# Patient Record
Sex: MALE | Race: BLACK OR AFRICAN AMERICAN | NOT HISPANIC OR LATINO | Employment: OTHER | URBAN - METROPOLITAN AREA
[De-identification: names, ages, dates, MRNs, and addresses within clinical notes are randomized per-mention and may not be internally consistent; named-entity substitution may affect disease eponyms.]

---

## 2023-02-15 PROBLEM — C61 PROSTATE CANCER (MULTI): Status: ACTIVE | Noted: 2023-02-15

## 2023-02-15 PROBLEM — E78.5 HYPERLIPIDEMIA: Status: ACTIVE | Noted: 2023-02-15

## 2023-02-15 PROBLEM — L60.0 ONYCHOCRYPTOSIS: Status: ACTIVE | Noted: 2023-02-15

## 2023-02-15 PROBLEM — R31.0 GROSS HEMATURIA: Status: ACTIVE | Noted: 2023-02-15

## 2023-02-15 PROBLEM — M25.569 JOINT PAIN, KNEE: Status: ACTIVE | Noted: 2023-02-15

## 2023-02-15 PROBLEM — E11.9 DIABETES MELLITUS (MULTI): Status: ACTIVE | Noted: 2023-02-15

## 2023-02-15 PROBLEM — J06.9 URI, ACUTE: Status: ACTIVE | Noted: 2023-02-15

## 2023-02-15 PROBLEM — R53.83 MALAISE AND FATIGUE: Status: ACTIVE | Noted: 2023-02-15

## 2023-02-15 PROBLEM — N52.9 MALE ERECTILE DISORDER OF ORGANIC ORIGIN: Status: ACTIVE | Noted: 2023-02-15

## 2023-02-15 PROBLEM — G47.9 SLEEPING DIFFICULTY: Status: ACTIVE | Noted: 2023-02-15

## 2023-02-15 PROBLEM — H52.4 BILATERAL PRESBYOPIA: Status: ACTIVE | Noted: 2023-02-15

## 2023-02-15 PROBLEM — R29.6 FALLS: Status: ACTIVE | Noted: 2023-02-15

## 2023-02-15 PROBLEM — B35.1 ONYCHOMYCOSIS: Status: ACTIVE | Noted: 2023-02-15

## 2023-02-15 PROBLEM — R06.83 SNORING: Status: ACTIVE | Noted: 2023-02-15
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
## 2023-02-15 PROBLEM — I10 HYPERTENSION: Status: ACTIVE | Noted: 2023-02-15

## 2023-02-15 PROBLEM — H25.13 NUCLEAR SCLEROSIS OF BOTH EYES: Status: ACTIVE | Noted: 2023-02-15

## 2023-02-15 PROBLEM — R36.1: Status: ACTIVE | Noted: 2023-02-15

## 2023-02-15 PROBLEM — R53.83 FATIGUE: Status: ACTIVE | Noted: 2023-02-15

## 2023-02-15 PROBLEM — G47.33 OSA (OBSTRUCTIVE SLEEP APNEA): Status: ACTIVE | Noted: 2023-02-15

## 2023-02-15 PROBLEM — E11.40 DIABETIC NEUROPATHY (MULTI): Status: ACTIVE | Noted: 2023-02-15

## 2023-02-15 PROBLEM — M25.519 SHOULDER PAIN: Status: ACTIVE | Noted: 2023-02-15

## 2023-02-15 PROBLEM — I48.19 ATRIAL FIBRILLATION, PERSISTENT (MULTI): Status: ACTIVE | Noted: 2023-02-15

## 2023-02-15 PROBLEM — H52.203 ASTIGMATISM, BILATERAL: Status: ACTIVE | Noted: 2023-02-15

## 2023-02-15 PROBLEM — H52.13 BILATERAL MYOPIA: Status: ACTIVE | Noted: 2023-02-15

## 2023-02-15 PROBLEM — H18.519 CORNEA GUTTATA: Status: ACTIVE | Noted: 2023-02-15

## 2023-02-15 PROBLEM — R30.9 URINARY PAIN: Status: ACTIVE | Noted: 2023-02-15

## 2023-02-15 PROBLEM — R53.81 MALAISE AND FATIGUE: Status: ACTIVE | Noted: 2023-02-15

## 2023-02-15 PROBLEM — S90.221A SUBUNGUAL HEMATOMA OF TOE, RIGHT, INITIAL ENCOUNTER: Status: ACTIVE | Noted: 2023-02-15

## 2023-02-15 PROBLEM — M79.673 PAIN OF FOOT: Status: ACTIVE | Noted: 2023-02-15

## 2023-02-15 PROBLEM — L85.3 XEROSIS CUTIS: Status: ACTIVE | Noted: 2023-02-15

## 2023-02-15 PROBLEM — E66.9 OBESITY: Status: ACTIVE | Noted: 2023-02-15

## 2023-02-15 PROBLEM — L60.1 ONYCHOLYSIS: Status: ACTIVE | Noted: 2023-02-15

## 2023-02-15 PROBLEM — M17.10 ARTHRITIS OF KNEE: Status: ACTIVE | Noted: 2023-02-15

## 2023-02-15 PROBLEM — E11.9 DIABETES MELLITUS TYPE 2 WITHOUT RETINOPATHY (MULTI): Status: ACTIVE | Noted: 2023-02-15

## 2023-02-15 PROBLEM — R29.818 SUSPECTED SLEEP APNEA: Status: ACTIVE | Noted: 2023-02-15

## 2023-02-15 PROBLEM — D12.6 TUBULAR ADENOMA OF COLON: Status: ACTIVE | Noted: 2023-02-15

## 2023-02-15 RX ORDER — BLOOD SUGAR DIAGNOSTIC
STRIP MISCELLANEOUS
COMMUNITY
Start: 2018-05-01 | End: 2023-07-24

## 2023-02-15 RX ORDER — HYDROCHLOROTHIAZIDE 25 MG/1
1 TABLET ORAL DAILY
COMMUNITY
Start: 2017-08-23 | End: 2023-04-05 | Stop reason: SDUPTHER

## 2023-02-15 RX ORDER — AMLODIPINE BESYLATE 10 MG/1
1 TABLET ORAL DAILY
COMMUNITY
Start: 2015-04-09 | End: 2023-04-05 | Stop reason: SDUPTHER

## 2023-02-15 RX ORDER — ATORVASTATIN CALCIUM 40 MG/1
1 TABLET, FILM COATED ORAL DAILY
COMMUNITY
Start: 2015-04-09 | End: 2023-04-05 | Stop reason: SDUPTHER

## 2023-02-15 RX ORDER — SILDENAFIL 50 MG/1
50 TABLET, FILM COATED ORAL AS NEEDED
COMMUNITY
Start: 2020-09-11 | End: 2023-12-04 | Stop reason: SDUPTHER

## 2023-02-15 RX ORDER — METOPROLOL TARTRATE 50 MG/1
1 TABLET ORAL EVERY 12 HOURS
COMMUNITY
Start: 2018-12-12 | End: 2023-04-05 | Stop reason: SDUPTHER

## 2023-02-15 RX ORDER — AMMONIUM LACTATE 12 G/100G
LOTION TOPICAL
COMMUNITY
Start: 2020-09-21

## 2023-02-15 RX ORDER — METFORMIN HYDROCHLORIDE 1000 MG/1
1 TABLET ORAL 2 TIMES DAILY
COMMUNITY
Start: 2018-02-02 | End: 2023-04-05 | Stop reason: SDUPTHER

## 2023-02-15 RX ORDER — POLYETHYLENE GLYCOL 3350, SODIUM SULFATE ANHYDROUS, SODIUM BICARBONATE, SODIUM CHLORIDE, POTASSIUM CHLORIDE 236; 22.74; 6.74; 5.86; 2.97 G/4L; G/4L; G/4L; G/4L; G/4L
POWDER, FOR SOLUTION ORAL
COMMUNITY
Start: 2022-11-21

## 2023-02-15 RX ORDER — MELOXICAM 15 MG/1
1 TABLET ORAL DAILY
COMMUNITY
Start: 2021-07-12 | End: 2023-04-05 | Stop reason: SDUPTHER

## 2023-02-15 RX ORDER — LISINOPRIL 40 MG/1
1 TABLET ORAL DAILY
COMMUNITY
Start: 2017-08-23 | End: 2023-04-05 | Stop reason: SDUPTHER

## 2023-04-03 RX ORDER — LISINOPRIL 40 MG/1
TABLET ORAL
Qty: 90 TABLET | Refills: 0 | OUTPATIENT
Start: 2023-04-03

## 2023-04-03 RX ORDER — HYDROCHLOROTHIAZIDE 25 MG/1
TABLET ORAL
Qty: 90 TABLET | Refills: 0 | OUTPATIENT
Start: 2023-04-03

## 2023-04-03 RX ORDER — METOPROLOL TARTRATE 50 MG/1
TABLET ORAL
Qty: 180 TABLET | Refills: 0 | OUTPATIENT
Start: 2023-04-03

## 2023-04-05 ENCOUNTER — OFFICE VISIT (OUTPATIENT)
Dept: PRIMARY CARE | Facility: CLINIC | Age: 73
End: 2023-04-05
Payer: COMMERCIAL

## 2023-04-05 VITALS
HEART RATE: 78 BPM | WEIGHT: 251 LBS | SYSTOLIC BLOOD PRESSURE: 127 MMHG | DIASTOLIC BLOOD PRESSURE: 82 MMHG | BODY MASS INDEX: 37.07 KG/M2

## 2023-04-05 DIAGNOSIS — M25.569 ARTHRALGIA OF KNEE, UNSPECIFIED LATERALITY: ICD-10-CM

## 2023-04-05 DIAGNOSIS — Z63.4 BEREAVEMENT DUE TO LIFE EVENT: ICD-10-CM

## 2023-04-05 DIAGNOSIS — G47.33 OSA (OBSTRUCTIVE SLEEP APNEA): ICD-10-CM

## 2023-04-05 DIAGNOSIS — I10 PRIMARY HYPERTENSION: ICD-10-CM

## 2023-04-05 DIAGNOSIS — I48.19 ATRIAL FIBRILLATION, PERSISTENT (MULTI): ICD-10-CM

## 2023-04-05 DIAGNOSIS — E11.9 DIABETES MELLITUS TYPE 2 WITHOUT RETINOPATHY (MULTI): Primary | ICD-10-CM

## 2023-04-05 LAB — POC HEMOGLOBIN: 7 G/DL (ref 13.5–17.5)

## 2023-04-05 PROCEDURE — 4010F ACE/ARB THERAPY RXD/TAKEN: CPT | Performed by: FAMILY MEDICINE

## 2023-04-05 PROCEDURE — 1159F MED LIST DOCD IN RCRD: CPT | Performed by: FAMILY MEDICINE

## 2023-04-05 PROCEDURE — 3079F DIAST BP 80-89 MM HG: CPT | Performed by: FAMILY MEDICINE

## 2023-04-05 PROCEDURE — 85018 HEMOGLOBIN: CPT | Performed by: FAMILY MEDICINE

## 2023-04-05 PROCEDURE — 99214 OFFICE O/P EST MOD 30 MIN: CPT | Performed by: FAMILY MEDICINE

## 2023-04-05 PROCEDURE — 3074F SYST BP LT 130 MM HG: CPT | Performed by: FAMILY MEDICINE

## 2023-04-05 PROCEDURE — 36416 COLLJ CAPILLARY BLOOD SPEC: CPT | Performed by: FAMILY MEDICINE

## 2023-04-05 RX ORDER — AMLODIPINE BESYLATE 10 MG/1
10 TABLET ORAL DAILY
Qty: 90 TABLET | Refills: 1 | Status: SHIPPED | OUTPATIENT
Start: 2023-04-05 | End: 2023-10-05 | Stop reason: SDUPTHER

## 2023-04-05 RX ORDER — LISINOPRIL 40 MG/1
40 TABLET ORAL DAILY
Qty: 90 TABLET | Refills: 1 | Status: SHIPPED | OUTPATIENT
Start: 2023-04-05 | End: 2023-10-05 | Stop reason: SDUPTHER

## 2023-04-05 RX ORDER — METOPROLOL TARTRATE 50 MG/1
50 TABLET ORAL EVERY 12 HOURS
Qty: 180 TABLET | Refills: 1 | Status: SHIPPED | OUTPATIENT
Start: 2023-04-05 | End: 2023-10-05 | Stop reason: SDUPTHER

## 2023-04-05 RX ORDER — MELOXICAM 15 MG/1
15 TABLET ORAL DAILY
Qty: 90 TABLET | Refills: 1 | Status: SHIPPED | OUTPATIENT
Start: 2023-04-05 | End: 2024-01-22

## 2023-04-05 RX ORDER — METFORMIN HYDROCHLORIDE 1000 MG/1
1000 TABLET ORAL 2 TIMES DAILY
Qty: 180 TABLET | Refills: 1 | Status: SHIPPED | OUTPATIENT
Start: 2023-04-05 | End: 2023-10-05 | Stop reason: SDUPTHER

## 2023-04-05 RX ORDER — HYDROCHLOROTHIAZIDE 25 MG/1
25 TABLET ORAL DAILY
Qty: 90 TABLET | Refills: 1 | Status: SHIPPED | OUTPATIENT
Start: 2023-04-05 | End: 2023-10-05 | Stop reason: SDUPTHER

## 2023-04-05 RX ORDER — ATORVASTATIN CALCIUM 40 MG/1
40 TABLET, FILM COATED ORAL DAILY
Qty: 90 TABLET | Refills: 1 | Status: SHIPPED | OUTPATIENT
Start: 2023-04-05 | End: 2023-10-05 | Stop reason: SDUPTHER

## 2023-04-05 SDOH — SOCIAL STABILITY - SOCIAL INSECURITY: DISSAPEARANCE AND DEATH OF FAMILY MEMBER: Z63.4

## 2023-04-05 ASSESSMENT — PATIENT HEALTH QUESTIONNAIRE - PHQ9
1. LITTLE INTEREST OR PLEASURE IN DOING THINGS: NOT AT ALL
SUM OF ALL RESPONSES TO PHQ9 QUESTIONS 1 AND 2: 0
2. FEELING DOWN, DEPRESSED OR HOPELESS: NOT AT ALL

## 2023-04-05 ASSESSMENT — ENCOUNTER SYMPTOMS
ABDOMINAL PAIN: 0
DIFFICULTY URINATING: 0
DYSPHORIC MOOD: 1
SLEEP DISTURBANCE: 0
FATIGUE: 1
SHORTNESS OF BREATH: 0
UNEXPECTED WEIGHT CHANGE: 0

## 2023-04-05 NOTE — PROGRESS NOTES
Subjective   Patient ID: Hollis Martin is a 73 y.o. male who presents for Diabetes.  Diabetes  Associated symptoms include fatigue. Pertinent negatives for diabetes include no chest pain.     Ryan presents for 3 month follow up.  He is noting some worsening depression, as he lost his brother to colon cancer 2 weeks ago.    Review of Systems   Constitutional:  Positive for fatigue. Negative for unexpected weight change.   Eyes:  Negative for visual disturbance.   Respiratory:  Negative for shortness of breath.    Cardiovascular:  Negative for chest pain.   Gastrointestinal:  Negative for abdominal pain.   Genitourinary:  Negative for difficulty urinating.   Psychiatric/Behavioral:  Positive for dysphoric mood. Negative for sleep disturbance.        Objective   Vitals:    04/05/23 1309   BP: 127/82   Pulse: 78   Weight: 114 kg (251 lb)      Physical Exam  Vitals reviewed.   Constitutional:       General: He is not in acute distress.     Appearance: Normal appearance.   HENT:      Head: Normocephalic and atraumatic.      Right Ear: External ear normal.      Left Ear: External ear normal.      Nose: Nose normal.      Mouth/Throat:      Mouth: Mucous membranes are moist.   Eyes:      Extraocular Movements: Extraocular movements intact.      Pupils: Pupils are equal, round, and reactive to light.   Cardiovascular:      Rate and Rhythm: Normal rate. Rhythm irregularly irregular.      Heart sounds: No murmur heard.     No friction rub. No gallop.   Pulmonary:      Effort: Pulmonary effort is normal.      Breath sounds: Normal breath sounds.   Abdominal:      General: There is no distension.      Palpations: Abdomen is soft.      Tenderness: There is no abdominal tenderness.   Musculoskeletal:      Cervical back: Neck supple. No tenderness.   Skin:     General: Skin is warm and dry.   Neurological:      General: No focal deficit present.      Mental Status: He is alert and oriented to person, place, and time.   Psychiatric:          Mood and Affect: Mood normal. Affect is flat.         Behavior: Behavior normal.         Assessment/Plan   There are no diagnoses linked to this encounter.    Problem List Items Addressed This Visit          Nervous    JOSHUA (obstructive sleep apnea)     Responding well to CPAP            Circulatory    Atrial fibrillation, persistent (CMS/HCC)     Rate controlled, continue Eliquis         Relevant Medications    amLODIPine (Norvasc) 10 mg tablet    apixaban (Eliquis) 5 mg tablet    metoprolol tartrate (Lopressor) 50 mg tablet    Other Relevant Orders    Follow Up In Primary Care    Hypertension     Good control, continue current medication         Relevant Medications    amLODIPine (Norvasc) 10 mg tablet    hydroCHLOROthiazide (HYDRODiuril) 25 mg tablet    lisinopril 40 mg tablet    metoprolol tartrate (Lopressor) 50 mg tablet       Endocrine/Metabolic    Diabetes mellitus type 2 without retinopathy (CMS/HCC) - Primary     A1c 7.0.  Continue current medication, follow up in 4 months         Relevant Medications    atorvastatin (Lipitor) 40 mg tablet    empagliflozin (Jardiance) 25 mg    metFORMIN (Glucophage) 1,000 mg tablet    SITagliptin phosphate (Januvia) 100 mg tablet    Other Relevant Orders    Follow Up In Primary Care       Other    Joint pain, knee    Relevant Medications    meloxicam (Mobic) 15 mg tablet     Other Visit Diagnoses       Bereavement due to life event

## 2023-07-22 DIAGNOSIS — E11.9 DIABETES MELLITUS TYPE 2 WITHOUT RETINOPATHY (MULTI): Primary | ICD-10-CM

## 2023-07-24 RX ORDER — BLOOD SUGAR DIAGNOSTIC
STRIP MISCELLANEOUS
Qty: 100 STRIP | Refills: 1 | Status: SHIPPED | OUTPATIENT
Start: 2023-07-24

## 2023-08-02 ENCOUNTER — OFFICE VISIT (OUTPATIENT)
Dept: PRIMARY CARE | Facility: CLINIC | Age: 73
End: 2023-08-02
Payer: COMMERCIAL

## 2023-08-02 VITALS
BODY MASS INDEX: 37.21 KG/M2 | DIASTOLIC BLOOD PRESSURE: 80 MMHG | HEART RATE: 72 BPM | SYSTOLIC BLOOD PRESSURE: 133 MMHG | WEIGHT: 252 LBS

## 2023-08-02 DIAGNOSIS — E11.42 DIABETIC POLYNEUROPATHY ASSOCIATED WITH TYPE 2 DIABETES MELLITUS (MULTI): ICD-10-CM

## 2023-08-02 DIAGNOSIS — I48.19 ATRIAL FIBRILLATION, PERSISTENT (MULTI): ICD-10-CM

## 2023-08-02 DIAGNOSIS — E11.9 DIABETES MELLITUS TYPE 2 WITHOUT RETINOPATHY (MULTI): Primary | ICD-10-CM

## 2023-08-02 DIAGNOSIS — C61 PROSTATE CANCER (MULTI): ICD-10-CM

## 2023-08-02 DIAGNOSIS — I10 PRIMARY HYPERTENSION: ICD-10-CM

## 2023-08-02 LAB — POC HEMOGLOBIN A1C: 7.2 % (ref 4.2–6.5)

## 2023-08-02 PROCEDURE — 1160F RVW MEDS BY RX/DR IN RCRD: CPT | Performed by: FAMILY MEDICINE

## 2023-08-02 PROCEDURE — 1126F AMNT PAIN NOTED NONE PRSNT: CPT | Performed by: FAMILY MEDICINE

## 2023-08-02 PROCEDURE — 83036 HEMOGLOBIN GLYCOSYLATED A1C: CPT | Performed by: FAMILY MEDICINE

## 2023-08-02 PROCEDURE — 3079F DIAST BP 80-89 MM HG: CPT | Performed by: FAMILY MEDICINE

## 2023-08-02 PROCEDURE — 4010F ACE/ARB THERAPY RXD/TAKEN: CPT | Performed by: FAMILY MEDICINE

## 2023-08-02 PROCEDURE — 1159F MED LIST DOCD IN RCRD: CPT | Performed by: FAMILY MEDICINE

## 2023-08-02 PROCEDURE — 1036F TOBACCO NON-USER: CPT | Performed by: FAMILY MEDICINE

## 2023-08-02 PROCEDURE — 3075F SYST BP GE 130 - 139MM HG: CPT | Performed by: FAMILY MEDICINE

## 2023-08-02 PROCEDURE — 99214 OFFICE O/P EST MOD 30 MIN: CPT | Performed by: FAMILY MEDICINE

## 2023-08-02 ASSESSMENT — ENCOUNTER SYMPTOMS
DYSPHORIC MOOD: 1
NECK PAIN: 0
ARTHRALGIAS: 0
BACK PAIN: 0
FATIGUE: 0
SHORTNESS OF BREATH: 0
ABDOMINAL PAIN: 0
DIFFICULTY URINATING: 0
UNEXPECTED WEIGHT CHANGE: 0
NUMBNESS: 1

## 2023-08-02 ASSESSMENT — PATIENT HEALTH QUESTIONNAIRE - PHQ9
2. FEELING DOWN, DEPRESSED OR HOPELESS: NOT AT ALL
SUM OF ALL RESPONSES TO PHQ9 QUESTIONS 1 AND 2: 0
1. LITTLE INTEREST OR PLEASURE IN DOING THINGS: NOT AT ALL

## 2023-08-02 NOTE — PROGRESS NOTES
Subjective   Patient ID: Hollis Martin is a 73 y.o. male who presents for Diabetes.  Diabetes  Pertinent negatives for diabetes include no chest pain and no fatigue.     Reg presents for follow up of his chronic issues.  Generally he is feeling well.  He occasionally feels down about loss of friends, but handling okay.  Review of Systems   Constitutional:  Negative for fatigue and unexpected weight change.   Eyes:  Negative for visual disturbance.        Has some mild issues with cataracts   Respiratory:  Negative for shortness of breath.    Cardiovascular:  Negative for chest pain.   Gastrointestinal:  Negative for abdominal pain.   Genitourinary:  Negative for difficulty urinating.   Musculoskeletal:  Negative for arthralgias, back pain and neck pain.   Neurological:  Positive for numbness (Slight worsening in feet).   Psychiatric/Behavioral:  Positive for dysphoric mood.        Objective   Vitals:    08/02/23 1550   BP: 133/80   Pulse: 72   Weight: 114 kg (252 lb)      Physical Exam  Vitals reviewed.   Constitutional:       General: He is not in acute distress.     Appearance: He is obese.   HENT:      Head: Normocephalic and atraumatic.      Right Ear: External ear normal.      Left Ear: External ear normal.      Nose: Nose normal.      Mouth/Throat:      Mouth: Mucous membranes are moist.   Eyes:      Extraocular Movements: Extraocular movements intact.      Pupils: Pupils are equal, round, and reactive to light.   Cardiovascular:      Rate and Rhythm: Normal rate. Rhythm irregularly irregular.      Heart sounds: No murmur heard.     No friction rub. No gallop.   Pulmonary:      Effort: Pulmonary effort is normal.      Breath sounds: Normal breath sounds.   Abdominal:      General: There is no distension.      Palpations: Abdomen is soft.      Tenderness: There is no abdominal tenderness.   Musculoskeletal:      Cervical back: Neck supple. No tenderness.   Skin:     General: Skin is warm and dry.    Neurological:      General: No focal deficit present.      Mental Status: He is alert and oriented to person, place, and time.   Psychiatric:         Mood and Affect: Mood normal.         Behavior: Behavior normal.         Assessment/Plan   Diagnoses and all orders for this visit:  Diabetes mellitus type 2 without retinopathy (CMS/HCC)  -     Follow Up In Primary Care  Atrial fibrillation, persistent (CMS/HCC)  -     Follow Up In Primary Care      Problem List Items Addressed This Visit       Atrial fibrillation, persistent (CMS/HCC)     Rate controlled, continue Eliquis         Diabetes mellitus type 2 without retinopathy (CMS/HCC) - Primary     A1c 7.2, still good control.  Continue same meds, follow up in 4 months         Relevant Orders    POCT glycosylated hemoglobin (Hb A1C) manually resulted (Completed)    Diabetic neuropathy (CMS/HCC)     Mild worsening but not bothersome         Hypertension     Well controlled         Prostate cancer (CMS/HCC)     Asymptomatic, has long term follow up with urology

## 2023-10-04 DIAGNOSIS — I48.19 ATRIAL FIBRILLATION, PERSISTENT (MULTI): ICD-10-CM

## 2023-10-04 RX ORDER — APIXABAN 5 MG/1
5 TABLET, FILM COATED ORAL 2 TIMES DAILY
Qty: 180 TABLET | Refills: 0 | Status: SHIPPED | OUTPATIENT
Start: 2023-10-04 | End: 2024-01-02

## 2023-10-05 ENCOUNTER — TELEPHONE (OUTPATIENT)
Dept: PRIMARY CARE | Facility: CLINIC | Age: 73
End: 2023-10-05
Payer: COMMERCIAL

## 2023-10-05 DIAGNOSIS — I48.19 ATRIAL FIBRILLATION, PERSISTENT (MULTI): ICD-10-CM

## 2023-10-05 DIAGNOSIS — I10 PRIMARY HYPERTENSION: ICD-10-CM

## 2023-10-05 DIAGNOSIS — E11.9 DIABETES MELLITUS TYPE 2 WITHOUT RETINOPATHY (MULTI): ICD-10-CM

## 2023-10-05 RX ORDER — HYDROCHLOROTHIAZIDE 25 MG/1
25 TABLET ORAL DAILY
Qty: 90 TABLET | Refills: 1 | Status: SHIPPED | OUTPATIENT
Start: 2023-10-05 | End: 2024-04-03 | Stop reason: SDUPTHER

## 2023-10-05 RX ORDER — LISINOPRIL 40 MG/1
40 TABLET ORAL DAILY
Qty: 90 TABLET | Refills: 1 | Status: SHIPPED | OUTPATIENT
Start: 2023-10-05 | End: 2024-04-03 | Stop reason: SDUPTHER

## 2023-10-05 RX ORDER — AMLODIPINE BESYLATE 10 MG/1
10 TABLET ORAL DAILY
Qty: 90 TABLET | Refills: 1 | Status: SHIPPED | OUTPATIENT
Start: 2023-10-05 | End: 2024-04-03 | Stop reason: SDUPTHER

## 2023-10-05 RX ORDER — METOPROLOL TARTRATE 50 MG/1
50 TABLET ORAL EVERY 12 HOURS
Qty: 180 TABLET | Refills: 1 | Status: SHIPPED | OUTPATIENT
Start: 2023-10-05 | End: 2024-04-03 | Stop reason: SDUPTHER

## 2023-10-05 RX ORDER — ATORVASTATIN CALCIUM 40 MG/1
40 TABLET, FILM COATED ORAL DAILY
Qty: 90 TABLET | Refills: 1 | Status: SHIPPED | OUTPATIENT
Start: 2023-10-05 | End: 2024-04-03 | Stop reason: SDUPTHER

## 2023-10-05 RX ORDER — METFORMIN HYDROCHLORIDE 1000 MG/1
1000 TABLET ORAL 2 TIMES DAILY
Qty: 180 TABLET | Refills: 1 | Status: SHIPPED | OUTPATIENT
Start: 2023-10-05 | End: 2024-04-03 | Stop reason: SDUPTHER

## 2023-10-05 NOTE — TELEPHONE ENCOUNTER
Patient left  requesting refills on Amlodipine, Lisinorpil, and hydrochlorothiazide to be sent to his local pharmacy

## 2023-11-16 ENCOUNTER — OFFICE VISIT (OUTPATIENT)
Dept: PODIATRY | Facility: CLINIC | Age: 73
End: 2023-11-16
Payer: COMMERCIAL

## 2023-11-16 DIAGNOSIS — B35.1 ONYCHOMYCOSIS: ICD-10-CM

## 2023-11-16 DIAGNOSIS — M79.672 PAIN IN BOTH FEET: Primary | ICD-10-CM

## 2023-11-16 DIAGNOSIS — L60.1 ONYCHOLYSIS: ICD-10-CM

## 2023-11-16 DIAGNOSIS — M79.671 PAIN IN BOTH FEET: Primary | ICD-10-CM

## 2023-11-16 DIAGNOSIS — E11.42 DIABETIC POLYNEUROPATHY ASSOCIATED WITH TYPE 2 DIABETES MELLITUS (MULTI): ICD-10-CM

## 2023-11-16 PROCEDURE — 4010F ACE/ARB THERAPY RXD/TAKEN: CPT | Performed by: PODIATRIST

## 2023-11-16 PROCEDURE — 1159F MED LIST DOCD IN RCRD: CPT | Performed by: PODIATRIST

## 2023-11-16 PROCEDURE — 99213 OFFICE O/P EST LOW 20 MIN: CPT | Performed by: PODIATRIST

## 2023-11-16 PROCEDURE — 1036F TOBACCO NON-USER: CPT | Performed by: PODIATRIST

## 2023-11-16 PROCEDURE — 1160F RVW MEDS BY RX/DR IN RCRD: CPT | Performed by: PODIATRIST

## 2023-11-16 PROCEDURE — 1126F AMNT PAIN NOTED NONE PRSNT: CPT | Performed by: PODIATRIST

## 2023-11-16 NOTE — PROGRESS NOTES
Chief Complaint   Patient presents with    nail care     CHANDA         Patient has the chief complaint of painful nails on both feet. There is a history of either ingrown nails or other problems with the nails. This includes difficulty with ambulation and wearing shoes.  Glucose stable.  No A1c's lately.  He does use moisturizer often.  There are no other complaints at this time. No changes in past medical history. Past medical history no changes review of systems per baseline.     Constitutional: Patient alert. No acute distress.  Psychiatric: Normal mood and affect.  HEENT: Sclera clear. Wearing glasses.  Respiratory: Breathing unlabored.  Dermatologic: Clinically nails are hypertrophic crumbly and yellow. Painful to palpation without evidence of any acute inflammatory infectious process. Baseline mild distal tuft the dermal tissue protruding beneath the nail plates first and fourth digits. No signs of bacterial infection. No acute inflammation. Not infected or inflamed. This is baseline. Web spaces are dry. No ulcers no pre-ulcerative areas.  Minimal xerosis noted.   Neurological: Mild sensory impairment.   Musculoskeletal: Motor function is intact for patient's age.  Vascular: Pedal pulses are intact and symmetric. No appreciable swelling.     Impression: Onychomycosis bilateral foot pain, xerosis, and diabetes and neuropathy.     -Today's treatment and course of therapy was discussed with the patient in detail. Patient's questions were answered. Proper foot care was discussed. This dictation was done using Dragon computer software and as such may contain grammatical errors.      -Diabetic foot care discussed.  Check feet daily.     -Review seasonal foot care.  Moisturizer use encouraged.

## 2023-12-04 ENCOUNTER — OFFICE VISIT (OUTPATIENT)
Dept: PRIMARY CARE | Facility: CLINIC | Age: 73
End: 2023-12-04
Payer: COMMERCIAL

## 2023-12-04 VITALS
HEIGHT: 68 IN | WEIGHT: 255 LBS | SYSTOLIC BLOOD PRESSURE: 149 MMHG | DIASTOLIC BLOOD PRESSURE: 90 MMHG | BODY MASS INDEX: 38.65 KG/M2 | OXYGEN SATURATION: 92 % | HEART RATE: 81 BPM

## 2023-12-04 DIAGNOSIS — I48.19 ATRIAL FIBRILLATION, PERSISTENT (MULTI): ICD-10-CM

## 2023-12-04 DIAGNOSIS — Z23 NEED FOR IMMUNIZATION AGAINST INFLUENZA: ICD-10-CM

## 2023-12-04 DIAGNOSIS — Z00.00 MEDICARE ANNUAL WELLNESS VISIT, SUBSEQUENT: ICD-10-CM

## 2023-12-04 DIAGNOSIS — N52.9 MALE ERECTILE DISORDER OF ORGANIC ORIGIN: ICD-10-CM

## 2023-12-04 DIAGNOSIS — E11.9 DIABETES MELLITUS TYPE 2 WITHOUT RETINOPATHY (MULTI): Primary | ICD-10-CM

## 2023-12-04 LAB — POC HEMOGLOBIN A1C: 6.9 % (ref 4.2–6.5)

## 2023-12-04 PROCEDURE — 3080F DIAST BP >= 90 MM HG: CPT | Performed by: FAMILY MEDICINE

## 2023-12-04 PROCEDURE — 3077F SYST BP >= 140 MM HG: CPT | Performed by: FAMILY MEDICINE

## 2023-12-04 PROCEDURE — 83036 HEMOGLOBIN GLYCOSYLATED A1C: CPT | Performed by: FAMILY MEDICINE

## 2023-12-04 PROCEDURE — 1126F AMNT PAIN NOTED NONE PRSNT: CPT | Performed by: FAMILY MEDICINE

## 2023-12-04 PROCEDURE — 90471 IMMUNIZATION ADMIN: CPT | Performed by: FAMILY MEDICINE

## 2023-12-04 PROCEDURE — 99213 OFFICE O/P EST LOW 20 MIN: CPT | Performed by: FAMILY MEDICINE

## 2023-12-04 PROCEDURE — 1170F FXNL STATUS ASSESSED: CPT | Performed by: FAMILY MEDICINE

## 2023-12-04 PROCEDURE — 90662 IIV NO PRSV INCREASED AG IM: CPT | Performed by: FAMILY MEDICINE

## 2023-12-04 PROCEDURE — 1036F TOBACCO NON-USER: CPT | Performed by: FAMILY MEDICINE

## 2023-12-04 PROCEDURE — 1160F RVW MEDS BY RX/DR IN RCRD: CPT | Performed by: FAMILY MEDICINE

## 2023-12-04 PROCEDURE — 1159F MED LIST DOCD IN RCRD: CPT | Performed by: FAMILY MEDICINE

## 2023-12-04 PROCEDURE — G0439 PPPS, SUBSEQ VISIT: HCPCS | Performed by: FAMILY MEDICINE

## 2023-12-04 PROCEDURE — 4010F ACE/ARB THERAPY RXD/TAKEN: CPT | Performed by: FAMILY MEDICINE

## 2023-12-04 RX ORDER — SILDENAFIL 100 MG/1
100 TABLET, FILM COATED ORAL AS NEEDED
Qty: 10 TABLET | Refills: 2 | Status: SHIPPED | OUTPATIENT
Start: 2023-12-04

## 2023-12-04 ASSESSMENT — PATIENT HEALTH QUESTIONNAIRE - PHQ9
SUM OF ALL RESPONSES TO PHQ9 QUESTIONS 1 AND 2: 0
2. FEELING DOWN, DEPRESSED OR HOPELESS: SEVERAL DAYS
SUM OF ALL RESPONSES TO PHQ9 QUESTIONS 1 AND 2: 2
1. LITTLE INTEREST OR PLEASURE IN DOING THINGS: NOT AT ALL
2. FEELING DOWN, DEPRESSED OR HOPELESS: NOT AT ALL
1. LITTLE INTEREST OR PLEASURE IN DOING THINGS: SEVERAL DAYS
10. IF YOU CHECKED OFF ANY PROBLEMS, HOW DIFFICULT HAVE THESE PROBLEMS MADE IT FOR YOU TO DO YOUR WORK, TAKE CARE OF THINGS AT HOME, OR GET ALONG WITH OTHER PEOPLE: SOMEWHAT DIFFICULT

## 2023-12-04 ASSESSMENT — ACTIVITIES OF DAILY LIVING (ADL)
TAKING_MEDICATION: INDEPENDENT
BATHING: INDEPENDENT
DRESSING: INDEPENDENT
DOING_HOUSEWORK: INDEPENDENT
MANAGING_FINANCES: INDEPENDENT
GROCERY_SHOPPING: INDEPENDENT

## 2023-12-04 ASSESSMENT — ENCOUNTER SYMPTOMS
DEPRESSION: 0
SLEEP DISTURBANCE: 1
LOSS OF SENSATION IN FEET: 0
NECK PAIN: 0
BACK PAIN: 0
ABDOMINAL PAIN: 0
ARTHRALGIAS: 0
OCCASIONAL FEELINGS OF UNSTEADINESS: 0
SHORTNESS OF BREATH: 0
DIFFICULTY URINATING: 0
DYSPHORIC MOOD: 1
FATIGUE: 1

## 2023-12-04 NOTE — PROGRESS NOTES
"Subjective   Reason for Visit: Hollis Martin is an 73 y.o. male here for a Medicare Wellness visit.          Reviewed all medications by prescribing practitioner or clinical pharmacist (such as prescriptions, OTCs, herbal therapies and supplements) and documented in the medical record.    HPI  Ryan presents for MWV, also diabetes follow up.  He feels well, still notes some mild fatigue and depressed mood attributed to losing close friends.  Patient Care Team:  Zeke Alford MD as PCP - General     Review of Systems   Constitutional:  Positive for fatigue.   Eyes:  Negative for visual disturbance.   Respiratory:  Negative for shortness of breath.    Cardiovascular:  Negative for chest pain.   Gastrointestinal:  Negative for abdominal pain.   Genitourinary:  Negative for difficulty urinating.   Musculoskeletal:  Negative for arthralgias, back pain and neck pain.   Psychiatric/Behavioral:  Positive for dysphoric mood (mild) and sleep disturbance.        Objective   Vitals:  /90   Pulse 81   Ht 1.715 m (5' 7.5\")   Wt 116 kg (255 lb)   SpO2 92%   BMI 39.35 kg/m²       Physical Exam  Vitals reviewed.   Constitutional:       General: He is not in acute distress.     Appearance: Normal appearance.   HENT:      Head: Normocephalic and atraumatic.      Right Ear: External ear normal.      Left Ear: External ear normal.      Nose: Nose normal.      Mouth/Throat:      Mouth: Mucous membranes are moist.   Eyes:      Extraocular Movements: Extraocular movements intact.      Pupils: Pupils are equal, round, and reactive to light.   Cardiovascular:      Rate and Rhythm: Normal rate. Rhythm irregularly irregular.      Heart sounds: No murmur heard.     No friction rub. No gallop.   Pulmonary:      Effort: Pulmonary effort is normal.      Breath sounds: Normal breath sounds.   Abdominal:      General: There is no distension.      Palpations: Abdomen is soft.      Tenderness: There is no abdominal tenderness. "   Musculoskeletal:      Cervical back: Neck supple. No tenderness.      Right lower leg: No edema.      Left lower leg: No edema.   Skin:     General: Skin is warm and dry.   Neurological:      General: No focal deficit present.      Mental Status: He is alert and oriented to person, place, and time.   Psychiatric:         Mood and Affect: Mood normal.         Behavior: Behavior normal.         Assessment/Plan   Problem List Items Addressed This Visit    None    Hollis was seen today for annual exam.  Diagnoses and all orders for this visit:  Medicare annual wellness visit, subsequent (Primary)  Comments:  Overall doing well.  Lives independently  Orders:  -     Follow Up In Primary Care - Medicare Annual; Future  Diabetes mellitus type 2 without retinopathy (CMS/HCC)  -     Follow Up In Primary Care - Established; Future  -     POCT glycosylated hemoglobin (Hb A1C) manually resulted  Atrial fibrillation, persistent (CMS/HCC)  -     Follow Up In Primary Care - Established; Future  Need for immunization against influenza  -     Flu vaccine, quadrivalent, high-dose, preservative free, age 65y+ (FLUZONE)  Male erectile disorder of organic origin  -     sildenafil (Viagra) 100 mg tablet; Take 1 tablet (100 mg) by mouth if needed for erectile dysfunction (take 1 tablet 30-60 minutes prior to need).  Other orders  -     Follow Up In Primary Care - Medicare Annual

## 2024-01-02 DIAGNOSIS — I48.19 ATRIAL FIBRILLATION, PERSISTENT (MULTI): ICD-10-CM

## 2024-01-02 RX ORDER — APIXABAN 5 MG/1
5 TABLET, FILM COATED ORAL 2 TIMES DAILY
Qty: 180 TABLET | Refills: 0 | Status: SHIPPED | OUTPATIENT
Start: 2024-01-02 | End: 2024-04-23

## 2024-01-19 DIAGNOSIS — M25.569 ARTHRALGIA OF KNEE, UNSPECIFIED LATERALITY: ICD-10-CM

## 2024-01-22 RX ORDER — MELOXICAM 15 MG/1
15 TABLET ORAL DAILY
Qty: 90 TABLET | Refills: 0 | Status: SHIPPED | OUTPATIENT
Start: 2024-01-22 | End: 2024-04-23

## 2024-01-26 ENCOUNTER — TELEPHONE (OUTPATIENT)
Dept: PRIMARY CARE | Facility: CLINIC | Age: 74
End: 2024-01-26
Payer: COMMERCIAL

## 2024-01-26 DIAGNOSIS — E11.9 DIABETES MELLITUS TYPE 2 WITHOUT RETINOPATHY (MULTI): Primary | ICD-10-CM

## 2024-01-26 NOTE — TELEPHONE ENCOUNTER
Patient left  on RX line asking for alternative med to Jardiance. He says the price went up and he can no longer afford it monthly.

## 2024-01-30 RX ORDER — DAPAGLIFLOZIN 10 MG/1
10 TABLET, FILM COATED ORAL DAILY
Qty: 90 TABLET | Refills: 1 | Status: SHIPPED | OUTPATIENT
Start: 2024-01-30 | End: 2024-04-03 | Stop reason: SDUPTHER

## 2024-01-30 NOTE — TELEPHONE ENCOUNTER
I will try to send Farxiga to pharmacy.  It is structurally similar to Jardiance.  If it is same cost patient doesn't have to , we could try something else

## 2024-02-01 NOTE — TELEPHONE ENCOUNTER
Patient called back to say Farxiga is too expensive as well. He says he is going to continue on the Jadiance until his next appt and will discuss alternative options at his appt. He will not be starting the Farxiga

## 2024-02-02 ENCOUNTER — TELEPHONE (OUTPATIENT)
Dept: PRIMARY CARE | Facility: CLINIC | Age: 74
End: 2024-02-02
Payer: COMMERCIAL

## 2024-02-02 NOTE — TELEPHONE ENCOUNTER
Teresa the Pharmacist from [facility] called to confirm the dosage and instructions for SITagliptin phosphate (Januvia) 100 mg tablet Take 1 tablet (100 mg) by mouth once daily.

## 2024-02-14 NOTE — TELEPHONE ENCOUNTER
Can let Reg know that I will be getting Farxiga tablet samples soon, and that it will also soon be generic so may be much easier for him to afford.

## 2024-02-15 ENCOUNTER — PROCEDURE VISIT (OUTPATIENT)
Dept: PODIATRY | Facility: CLINIC | Age: 74
End: 2024-02-15
Payer: COMMERCIAL

## 2024-02-15 DIAGNOSIS — M79.672 PAIN IN BOTH FEET: Primary | ICD-10-CM

## 2024-02-15 DIAGNOSIS — M79.671 PAIN IN BOTH FEET: Primary | ICD-10-CM

## 2024-02-15 DIAGNOSIS — L60.1 ONYCHOLYSIS: ICD-10-CM

## 2024-02-15 DIAGNOSIS — B35.1 ONYCHOMYCOSIS: ICD-10-CM

## 2024-02-15 DIAGNOSIS — L85.3 XEROSIS CUTIS: ICD-10-CM

## 2024-02-15 DIAGNOSIS — E11.42 DIABETIC POLYNEUROPATHY ASSOCIATED WITH TYPE 2 DIABETES MELLITUS (MULTI): ICD-10-CM

## 2024-02-15 PROCEDURE — 99213 OFFICE O/P EST LOW 20 MIN: CPT | Performed by: PODIATRIST

## 2024-02-15 NOTE — PROGRESS NOTES
Chief Complaint   Patient presents with    DM Foot Care     Patient presents today for diabetic foot care         Patient has the chief complaint of painful nails on both feet. There is a history of either ingrown nails or other problems with the nails. This includes difficulty with ambulation and wearing shoes.  Currently taking Jardiance for diabetes control.  He does use moisturizer often.  There are no other complaints at this time. No changes in past medical history. Past medical history no changes review of systems per baseline.     Constitutional: Patient alert. No acute distress.  Psychiatric: Normal mood and affect.  HEENT: Sclera clear. Wearing glasses.  Respiratory: Breathing unlabored.  Dermatologic: Clinically nails are hypertrophic crumbly and yellow. Painful to palpation without evidence of any acute inflammatory infectious process. Baseline mild distal tuft the dermal tissue protruding beneath the nail plates first and fourth digits. No signs of bacterial infection. No acute inflammation. Not infected or inflamed. This is baseline. Web spaces are dry. No ulcers no pre-ulcerative areas.  Minimal xerosis noted.   Neurological: Mild sensory impairment.   Musculoskeletal: Motor function is intact for patient's age.  Vascular: Pedal pulses are intact and symmetric. No appreciable swelling.     Impression: Onychomycosis bilateral foot pain, xerosis, and diabetes and neuropathy.     -Today's treatment and course of therapy was discussed with the patient in detail. Patient's questions were answered. Proper foot care was discussed. This dictation was done using Dragon computer software and as such may contain grammatical errors.      -Diabetic foot care discussed.  Check feet daily.     -Review seasonal foot care.  Continue with moisturizer use encouraged.

## 2024-03-07 DIAGNOSIS — E11.9 DIABETES MELLITUS TYPE 2 WITHOUT RETINOPATHY (MULTI): Primary | ICD-10-CM

## 2024-03-07 RX ORDER — EMPAGLIFLOZIN 25 MG/1
25 TABLET, FILM COATED ORAL DAILY
Qty: 90 TABLET | Refills: 0 | OUTPATIENT
Start: 2024-03-07

## 2024-03-07 NOTE — TELEPHONE ENCOUNTER
PT called rx line requesting a refill on his jardiance 25 mg, PT has also asked if there was any discount cards to help with the cost of the rx.

## 2024-04-03 ENCOUNTER — OFFICE VISIT (OUTPATIENT)
Dept: PRIMARY CARE | Facility: CLINIC | Age: 74
End: 2024-04-03
Payer: COMMERCIAL

## 2024-04-03 VITALS
OXYGEN SATURATION: 93 % | WEIGHT: 254 LBS | SYSTOLIC BLOOD PRESSURE: 126 MMHG | BODY MASS INDEX: 38.49 KG/M2 | HEIGHT: 68 IN | DIASTOLIC BLOOD PRESSURE: 68 MMHG

## 2024-04-03 DIAGNOSIS — I48.19 ATRIAL FIBRILLATION, PERSISTENT (MULTI): ICD-10-CM

## 2024-04-03 DIAGNOSIS — I10 PRIMARY HYPERTENSION: ICD-10-CM

## 2024-04-03 DIAGNOSIS — E11.9 DIABETES MELLITUS TYPE 2 WITHOUT RETINOPATHY (MULTI): Primary | ICD-10-CM

## 2024-04-03 LAB — POC HEMOGLOBIN A1C: 7.4 % (ref 4.2–6.5)

## 2024-04-03 PROCEDURE — 99214 OFFICE O/P EST MOD 30 MIN: CPT | Performed by: FAMILY MEDICINE

## 2024-04-03 PROCEDURE — 3078F DIAST BP <80 MM HG: CPT | Performed by: FAMILY MEDICINE

## 2024-04-03 PROCEDURE — 83036 HEMOGLOBIN GLYCOSYLATED A1C: CPT | Performed by: FAMILY MEDICINE

## 2024-04-03 PROCEDURE — 3074F SYST BP LT 130 MM HG: CPT | Performed by: FAMILY MEDICINE

## 2024-04-03 PROCEDURE — 1123F ACP DISCUSS/DSCN MKR DOCD: CPT | Performed by: FAMILY MEDICINE

## 2024-04-03 PROCEDURE — 4010F ACE/ARB THERAPY RXD/TAKEN: CPT | Performed by: FAMILY MEDICINE

## 2024-04-03 PROCEDURE — 1160F RVW MEDS BY RX/DR IN RCRD: CPT | Performed by: FAMILY MEDICINE

## 2024-04-03 PROCEDURE — 1159F MED LIST DOCD IN RCRD: CPT | Performed by: FAMILY MEDICINE

## 2024-04-03 PROCEDURE — 1036F TOBACCO NON-USER: CPT | Performed by: FAMILY MEDICINE

## 2024-04-03 RX ORDER — LANCETS
EACH MISCELLANEOUS
Qty: 100 EACH | Refills: 3 | Status: SHIPPED | OUTPATIENT
Start: 2024-04-03

## 2024-04-03 RX ORDER — METFORMIN HYDROCHLORIDE 1000 MG/1
1000 TABLET ORAL 2 TIMES DAILY
Qty: 180 TABLET | Refills: 1 | Status: SHIPPED | OUTPATIENT
Start: 2024-04-03 | End: 2024-09-30

## 2024-04-03 RX ORDER — AMLODIPINE BESYLATE 10 MG/1
10 TABLET ORAL DAILY
Qty: 90 TABLET | Refills: 1 | Status: SHIPPED | OUTPATIENT
Start: 2024-04-03 | End: 2024-09-30

## 2024-04-03 RX ORDER — ATORVASTATIN CALCIUM 40 MG/1
40 TABLET, FILM COATED ORAL DAILY
Qty: 90 TABLET | Refills: 1 | Status: SHIPPED | OUTPATIENT
Start: 2024-04-03 | End: 2024-09-30

## 2024-04-03 RX ORDER — METOPROLOL TARTRATE 50 MG/1
50 TABLET ORAL EVERY 12 HOURS
Qty: 180 TABLET | Refills: 1 | Status: SHIPPED | OUTPATIENT
Start: 2024-04-03 | End: 2024-09-30

## 2024-04-03 RX ORDER — LISINOPRIL 40 MG/1
40 TABLET ORAL DAILY
Qty: 90 TABLET | Refills: 1 | Status: SHIPPED | OUTPATIENT
Start: 2024-04-03 | End: 2024-09-30

## 2024-04-03 RX ORDER — HYDROCHLOROTHIAZIDE 25 MG/1
25 TABLET ORAL DAILY
Qty: 90 TABLET | Refills: 1 | Status: SHIPPED | OUTPATIENT
Start: 2024-04-03 | End: 2024-09-30

## 2024-04-03 RX ORDER — DAPAGLIFLOZIN 10 MG/1
10 TABLET, FILM COATED ORAL DAILY
Qty: 56 TABLET | Refills: 0 | Status: SHIPPED | COMMUNITY
Start: 2024-04-03 | End: 2024-09-30

## 2024-04-03 ASSESSMENT — ENCOUNTER SYMPTOMS
SLEEP DISTURBANCE: 0
ARTHRALGIAS: 1
NUMBNESS: 1
CONSTIPATION: 0
DIFFICULTY URINATING: 0
ABDOMINAL PAIN: 0
FATIGUE: 1
SHORTNESS OF BREATH: 0
DIARRHEA: 0

## 2024-04-03 ASSESSMENT — PATIENT HEALTH QUESTIONNAIRE - PHQ9
1. LITTLE INTEREST OR PLEASURE IN DOING THINGS: NOT AT ALL
2. FEELING DOWN, DEPRESSED OR HOPELESS: NOT AT ALL
SUM OF ALL RESPONSES TO PHQ9 QUESTIONS 1 AND 2: 0

## 2024-04-03 NOTE — PROGRESS NOTES
"Subjective   Patient ID: Hollis Martin is a 74 y.o. male who presents for Diabetes (PT is here for 3 month DM follow up and medication review).  Diabetes  Associated symptoms include fatigue. Pertinent negatives for diabetes include no chest pain.     Reg presents for 3 month follow up.  Generally he feels well, overall achy and less mobile.  He cannot afford copay for SGLT2 meds.  Review of Systems   Constitutional:  Positive for fatigue.   Eyes:  Negative for visual disturbance.   Respiratory:  Negative for shortness of breath.    Cardiovascular:  Negative for chest pain.   Gastrointestinal:  Negative for abdominal pain, constipation and diarrhea.   Genitourinary:  Negative for difficulty urinating.   Musculoskeletal:  Positive for arthralgias.   Neurological:  Positive for numbness (mild to fingers).   Psychiatric/Behavioral:  Negative for sleep disturbance.        Objective   Vitals:    04/03/24 1403   BP: 126/68   SpO2: 93%   Weight: 115 kg (254 lb)   Height: 1.715 m (5' 7.5\")      Physical Exam  Vitals reviewed.   Constitutional:       General: He is not in acute distress.     Appearance: Normal appearance.   HENT:      Head: Normocephalic and atraumatic.      Right Ear: External ear normal.      Left Ear: External ear normal.      Nose: Nose normal.      Mouth/Throat:      Mouth: Mucous membranes are moist.   Eyes:      Extraocular Movements: Extraocular movements intact.      Pupils: Pupils are equal, round, and reactive to light.   Cardiovascular:      Rate and Rhythm: Normal rate. Rhythm regularly irregular.      Heart sounds: No murmur heard.     No friction rub. No gallop.   Pulmonary:      Effort: Pulmonary effort is normal.      Breath sounds: Normal breath sounds.   Abdominal:      General: There is no distension.      Palpations: Abdomen is soft.      Tenderness: There is no abdominal tenderness.   Musculoskeletal:      Cervical back: Neck supple. No tenderness.   Skin:     General: Skin is warm and " dry.   Neurological:      General: No focal deficit present.      Mental Status: He is alert and oriented to person, place, and time.   Psychiatric:         Mood and Affect: Mood normal.         Behavior: Behavior normal.         Assessment/Plan   Diagnoses and all orders for this visit:  Atrial fibrillation, persistent (CMS/HCC)  -     Follow Up In Primary Care - Established  Diabetes mellitus type 2 without retinopathy (CMS/HCC)  -     Follow Up In Primary Care - Established      Problem List Items Addressed This Visit             ICD-10-CM    Atrial fibrillation, persistent (CMS/HCC) I48.19     Rate controlled.         Relevant Medications    amLODIPine (Norvasc) 10 mg tablet    metoprolol tartrate (Lopressor) 50 mg tablet    Diabetes mellitus type 2 without retinopathy (CMS/HCC) - Primary E11.9     A1c 7.4 today.  Given about 2-3 month samples of Farxiga.         Relevant Medications    dapagliflozin propanediol (Farxiga) 10 mg    atorvastatin (Lipitor) 40 mg tablet    metFORMIN (Glucophage) 1,000 mg tablet    lancets misc    Other Relevant Orders    POCT glycosylated hemoglobin (Hb A1C) manually resulted    Hypertension I10     Good control.         Relevant Medications    amLODIPine (Norvasc) 10 mg tablet    hydroCHLOROthiazide (HYDRODiuril) 25 mg tablet    lisinopril 40 mg tablet    metoprolol tartrate (Lopressor) 50 mg tablet

## 2024-04-23 DIAGNOSIS — I48.19 ATRIAL FIBRILLATION, PERSISTENT (MULTI): ICD-10-CM

## 2024-04-23 DIAGNOSIS — M25.569 ARTHRALGIA OF KNEE, UNSPECIFIED LATERALITY: ICD-10-CM

## 2024-04-23 RX ORDER — APIXABAN 5 MG/1
TABLET, FILM COATED ORAL
Qty: 180 TABLET | Refills: 0 | Status: SHIPPED | OUTPATIENT
Start: 2024-04-23

## 2024-04-23 RX ORDER — MELOXICAM 15 MG/1
15 TABLET ORAL DAILY
Qty: 90 TABLET | Refills: 0 | Status: SHIPPED | OUTPATIENT
Start: 2024-04-23

## 2024-05-16 ENCOUNTER — APPOINTMENT (OUTPATIENT)
Dept: PODIATRY | Facility: CLINIC | Age: 74
End: 2024-05-16
Payer: COMMERCIAL

## 2024-05-23 ENCOUNTER — PROCEDURE VISIT (OUTPATIENT)
Dept: PODIATRY | Facility: CLINIC | Age: 74
End: 2024-05-23
Payer: COMMERCIAL

## 2024-05-23 DIAGNOSIS — L85.3 XEROSIS CUTIS: ICD-10-CM

## 2024-05-23 DIAGNOSIS — M79.671 PAIN IN BOTH FEET: Primary | ICD-10-CM

## 2024-05-23 DIAGNOSIS — L60.1 ONYCHOLYSIS: ICD-10-CM

## 2024-05-23 DIAGNOSIS — E11.42 DIABETIC POLYNEUROPATHY ASSOCIATED WITH TYPE 2 DIABETES MELLITUS (MULTI): ICD-10-CM

## 2024-05-23 DIAGNOSIS — B35.1 ONYCHOMYCOSIS: ICD-10-CM

## 2024-05-23 DIAGNOSIS — M79.672 PAIN IN BOTH FEET: Primary | ICD-10-CM

## 2024-05-23 PROCEDURE — 99213 OFFICE O/P EST LOW 20 MIN: CPT | Performed by: PODIATRIST

## 2024-05-23 NOTE — PROGRESS NOTES
Chief Complaint   Patient presents with    DM Foot Care     Patient is here today for diabetic foot care         Chief complaint painful nails on both feet.  Diabetes last A1c was 7.4%.  Also he has recently noticed some damage to the right hallux nail plate.  It was  he pulled some off yesterday.  No specific trauma.  No other symptoms or complaints.  No changes past medical history.    Constitutional: Patient alert. No acute distress.  Psychiatric: Normal mood and affect.  HEENT: Sclera clear. Wearing glasses.  Respiratory: Breathing unlabored.  Dermatologic: Clinically nails are hypertrophic crumbly and yellow.  Onycholysis with the right hallux nail.  Distal portion of the nail plate is .  Proximal attachment.  Positive subungual debris.  Painful to palpation without evidence of any acute inflammatory infectious process. Baseline mild distal tuft the dermal tissue protruding beneath the nail plates first and fourth digits.  This is baseline.  No signs of bacterial infection. Web spaces are dry. No ulcers no pre-ulcerative areas.  Minimal xerosis noted.   Neurological: Mild sensory impairment.   Musculoskeletal: Motor function is intact for patient's age.  Vascular: Pedal pulses are intact and symmetric. No appreciable swelling.     Impression: Onychomycosis bilateral foot pain, onycholysis, xerosis, and diabetes and neuropathy.     -Today's treatment and course of therapy was discussed with the patient in detail. Patient's questions were answered. Proper foot care was discussed. This dictation was done using Dragon computer software and as such may contain grammatical errors.      -Partial avulsion of the right hallux nail plate may need total avulsion.  Please monitor the area.  This time no special care as the nailbed is dry and intact.  There is further separation or lifting of the nail please let me know.    -Diabetic foot care discussed.  Check feet daily.     -Review seasonal foot care.   Continue with moisturizer use encouraged.

## 2024-06-18 ENCOUNTER — TELEPHONE (OUTPATIENT)
Dept: PRIMARY CARE | Facility: CLINIC | Age: 74
End: 2024-06-18
Payer: COMMERCIAL

## 2024-06-18 DIAGNOSIS — E11.9 DIABETES MELLITUS TYPE 2 WITHOUT RETINOPATHY (MULTI): ICD-10-CM

## 2024-06-18 RX ORDER — DAPAGLIFLOZIN 10 MG/1
10 TABLET, FILM COATED ORAL DAILY
Qty: 30 TABLET | Refills: 5 | Status: SHIPPED | OUTPATIENT
Start: 2024-06-18 | End: 2024-12-15

## 2024-06-18 NOTE — TELEPHONE ENCOUNTER
I will write for refill, but will also provide a box of samples of both Farxiga and Jardiance that patient could use to help defray the cost.

## 2024-06-18 NOTE — TELEPHONE ENCOUNTER
Patient left voicemail on provider refill line for:      Name of medication & dose:   dapagliflozin propanediol (Farxiga) 10 mg    Quantity & refills requested: as per last time  Amount of medication remaining:    If out of medication, for how long?      Last office visit:  4/3/24  Last labs (if applicable):    Future appointment?  N/a    Pharmacy verified.  [facility]   Allergies updated.       The patient was informed the requested medication request will be processed within 3 business days unless they are contacted by a staff member to advise otherwise.        Pt is asking if there may be something cheaper than the Farxiga bc of the cost,  but he doesn't want to be out of medication.      maximum assist (25% patients effort)

## 2024-08-13 ENCOUNTER — APPOINTMENT (OUTPATIENT)
Dept: PRIMARY CARE | Facility: CLINIC | Age: 74
End: 2024-08-13
Payer: COMMERCIAL

## 2024-08-13 VITALS
TEMPERATURE: 98 F | HEART RATE: 92 BPM | WEIGHT: 249 LBS | SYSTOLIC BLOOD PRESSURE: 127 MMHG | RESPIRATION RATE: 18 BRPM | DIASTOLIC BLOOD PRESSURE: 98 MMHG | BODY MASS INDEX: 38.42 KG/M2 | OXYGEN SATURATION: 95 %

## 2024-08-13 DIAGNOSIS — I48.19 ATRIAL FIBRILLATION, PERSISTENT (MULTI): ICD-10-CM

## 2024-08-13 DIAGNOSIS — G47.33 OSA (OBSTRUCTIVE SLEEP APNEA): ICD-10-CM

## 2024-08-13 DIAGNOSIS — E11.42 TYPE 2 DIABETES MELLITUS WITH DIABETIC POLYNEUROPATHY, WITHOUT LONG-TERM CURRENT USE OF INSULIN (MULTI): Primary | ICD-10-CM

## 2024-08-13 DIAGNOSIS — C61 PROSTATE CANCER (MULTI): ICD-10-CM

## 2024-08-13 PROCEDURE — 1160F RVW MEDS BY RX/DR IN RCRD: CPT | Performed by: FAMILY MEDICINE

## 2024-08-13 PROCEDURE — 3080F DIAST BP >= 90 MM HG: CPT | Performed by: FAMILY MEDICINE

## 2024-08-13 PROCEDURE — 1159F MED LIST DOCD IN RCRD: CPT | Performed by: FAMILY MEDICINE

## 2024-08-13 PROCEDURE — 1123F ACP DISCUSS/DSCN MKR DOCD: CPT | Performed by: FAMILY MEDICINE

## 2024-08-13 PROCEDURE — 1036F TOBACCO NON-USER: CPT | Performed by: FAMILY MEDICINE

## 2024-08-13 PROCEDURE — 99214 OFFICE O/P EST MOD 30 MIN: CPT | Performed by: FAMILY MEDICINE

## 2024-08-13 PROCEDURE — 3074F SYST BP LT 130 MM HG: CPT | Performed by: FAMILY MEDICINE

## 2024-08-13 PROCEDURE — 4010F ACE/ARB THERAPY RXD/TAKEN: CPT | Performed by: FAMILY MEDICINE

## 2024-08-13 NOTE — PROGRESS NOTES
"Subjective   Patient ID: Hollis Martin is a 74 y.o. male who presents for New Patient Visit and Diabetes.    HPI   CDM    Type 2 DM:  Farxiga is expensive: : resources to make affordable medication     Could not wear CPAPdue to power out rage happened for 4 days  \" Slept well, than I have been in years\"     Blood sugars 130-140s fasting    Did not take today's medications yet.    Prostate cancer in remission for 10 +years.    Review of Systems   All other systems reviewed and are negative.      Objective   BP (!) 127/98 (BP Location: Left arm, Patient Position: Sitting, BP Cuff Size: Adult)   Pulse 92   Temp 36.7 °C (98 °F) (Oral)   Resp 18   Wt 113 kg (249 lb)   SpO2 95%   BMI 38.42 kg/m²     Physical Exam  Vitals and nursing note reviewed.   Cardiovascular:      Rate and Rhythm: Normal rate.   Pulmonary:      Effort: Pulmonary effort is normal.      Breath sounds: Normal breath sounds.   Musculoskeletal:      Cervical back: Neck supple.   Lymphadenopathy:      Cervical: No cervical adenopathy.   Neurological:      Mental Status: He is alert.   Psychiatric:         Mood and Affect: Mood normal.         Behavior: Behavior normal.         Thought Content: Thought content normal.         Judgment: Judgment normal.         Assessment/Plan   Diagnoses and all orders for this visit:  Type 2 diabetes mellitus with diabetic polyneuropathy, without long-term current use of insulin (Multi)  -     Lipid panel; Future  -     Comprehensive Metabolic Panel; Future  -     CBC and Auto Differential; Future  -     PSA; Future  -     TSH; Future  -     Methylmalonic acid, serum; Future  -     Referral to Clinical Pharmacy; Future  -     Hemoglobin A1c; Future  Prostate cancer (Multi)  Atrial fibrillation, persistent (Multi)  -     apixaban (Eliquis) 5 mg tablet; Take 0.5 tablets (2.5 mg) by mouth 2 times a day.  JOSHUA (obstructive sleep apnea)  -     In-Center Sleep Study (Non-Sleep Provider Only); Future  Other orders  -     " Follow Up In Primary Care - Established; Future

## 2024-08-15 ENCOUNTER — TELEPHONE (OUTPATIENT)
Dept: PHARMACY | Facility: HOSPITAL | Age: 74
End: 2024-08-15
Payer: COMMERCIAL

## 2024-08-19 DIAGNOSIS — I48.19 ATRIAL FIBRILLATION, PERSISTENT (MULTI): ICD-10-CM

## 2024-08-22 ENCOUNTER — APPOINTMENT (OUTPATIENT)
Dept: PODIATRY | Facility: CLINIC | Age: 74
End: 2024-08-22
Payer: COMMERCIAL

## 2024-08-22 DIAGNOSIS — M25.569 ARTHRALGIA OF KNEE, UNSPECIFIED LATERALITY: ICD-10-CM

## 2024-08-22 RX ORDER — MELOXICAM 15 MG/1
15 TABLET ORAL DAILY
Qty: 90 TABLET | Refills: 0 | OUTPATIENT
Start: 2024-08-22

## 2024-08-26 ENCOUNTER — APPOINTMENT (OUTPATIENT)
Dept: PODIATRY | Facility: CLINIC | Age: 74
End: 2024-08-26
Payer: COMMERCIAL

## 2024-08-27 DIAGNOSIS — M25.569 ARTHRALGIA OF KNEE, UNSPECIFIED LATERALITY: ICD-10-CM

## 2024-08-29 RX ORDER — MELOXICAM 15 MG/1
15 TABLET ORAL DAILY
Qty: 90 TABLET | Refills: 0 | OUTPATIENT
Start: 2024-08-29

## 2024-09-13 DIAGNOSIS — E11.9 DIABETES MELLITUS TYPE 2 WITHOUT RETINOPATHY (MULTI): ICD-10-CM

## 2024-09-14 RX ORDER — DAPAGLIFLOZIN 10 MG/1
10 TABLET, FILM COATED ORAL DAILY
Qty: 90 TABLET | Refills: 1 | Status: SHIPPED | OUTPATIENT
Start: 2024-09-14 | End: 2025-09-14

## 2024-09-17 ENCOUNTER — LAB (OUTPATIENT)
Dept: LAB | Facility: LAB | Age: 74
End: 2024-09-17
Payer: COMMERCIAL

## 2024-09-17 DIAGNOSIS — E11.42 TYPE 2 DIABETES MELLITUS WITH DIABETIC POLYNEUROPATHY, WITHOUT LONG-TERM CURRENT USE OF INSULIN: ICD-10-CM

## 2024-09-17 LAB
ALBUMIN SERPL BCP-MCNC: 4.3 G/DL (ref 3.4–5)
ALP SERPL-CCNC: 79 U/L (ref 33–136)
ALT SERPL W P-5'-P-CCNC: 15 U/L (ref 10–52)
ANION GAP SERPL CALC-SCNC: 14 MMOL/L (ref 10–20)
AST SERPL W P-5'-P-CCNC: 15 U/L (ref 9–39)
BASOPHILS # BLD AUTO: 0.05 X10*3/UL (ref 0–0.1)
BASOPHILS NFR BLD AUTO: 0.6 %
BILIRUB SERPL-MCNC: 1 MG/DL (ref 0–1.2)
BUN SERPL-MCNC: 14 MG/DL (ref 6–23)
CALCIUM SERPL-MCNC: 9.7 MG/DL (ref 8.6–10.3)
CHLORIDE SERPL-SCNC: 100 MMOL/L (ref 98–107)
CHOLEST SERPL-MCNC: 111 MG/DL (ref 0–199)
CHOLESTEROL/HDL RATIO: 2.9
CO2 SERPL-SCNC: 29 MMOL/L (ref 21–32)
CREAT SERPL-MCNC: 0.88 MG/DL (ref 0.5–1.3)
EGFRCR SERPLBLD CKD-EPI 2021: 90 ML/MIN/1.73M*2
EOSINOPHIL # BLD AUTO: 0.18 X10*3/UL (ref 0–0.4)
EOSINOPHIL NFR BLD AUTO: 2.3 %
ERYTHROCYTE [DISTWIDTH] IN BLOOD BY AUTOMATED COUNT: 19.3 % (ref 11.5–14.5)
EST. AVERAGE GLUCOSE BLD GHB EST-MCNC: 177 MG/DL
GLUCOSE SERPL-MCNC: 141 MG/DL (ref 74–99)
HBA1C MFR BLD: 7.8 %
HCT VFR BLD AUTO: 50.1 % (ref 41–52)
HDLC SERPL-MCNC: 38 MG/DL
HGB BLD-MCNC: 15.9 G/DL (ref 13.5–17.5)
IMM GRANULOCYTES # BLD AUTO: 0.02 X10*3/UL (ref 0–0.5)
IMM GRANULOCYTES NFR BLD AUTO: 0.3 % (ref 0–0.9)
LDLC SERPL CALC-MCNC: 59 MG/DL
LYMPHOCYTES # BLD AUTO: 2.45 X10*3/UL (ref 0.8–3)
LYMPHOCYTES NFR BLD AUTO: 31.1 %
MCH RBC QN AUTO: 26.5 PG (ref 26–34)
MCHC RBC AUTO-ENTMCNC: 31.7 G/DL (ref 32–36)
MCV RBC AUTO: 84 FL (ref 80–100)
MONOCYTES # BLD AUTO: 0.72 X10*3/UL (ref 0.05–0.8)
MONOCYTES NFR BLD AUTO: 9.1 %
NEUTROPHILS # BLD AUTO: 4.47 X10*3/UL (ref 1.6–5.5)
NEUTROPHILS NFR BLD AUTO: 56.6 %
NON HDL CHOLESTEROL: 73 MG/DL (ref 0–149)
NRBC BLD-RTO: 0 /100 WBCS (ref 0–0)
PLATELET # BLD AUTO: 229 X10*3/UL (ref 150–450)
POTASSIUM SERPL-SCNC: 4.2 MMOL/L (ref 3.5–5.3)
PROT SERPL-MCNC: 7.5 G/DL (ref 6.4–8.2)
RBC # BLD AUTO: 5.99 X10*6/UL (ref 4.5–5.9)
SODIUM SERPL-SCNC: 139 MMOL/L (ref 136–145)
TRIGL SERPL-MCNC: 68 MG/DL (ref 0–149)
TSH SERPL-ACNC: 1.19 MIU/L (ref 0.44–3.98)
VLDL: 14 MG/DL (ref 0–40)
WBC # BLD AUTO: 7.9 X10*3/UL (ref 4.4–11.3)

## 2024-09-17 PROCEDURE — 84153 ASSAY OF PSA TOTAL: CPT

## 2024-09-17 PROCEDURE — 83036 HEMOGLOBIN GLYCOSYLATED A1C: CPT

## 2024-09-17 PROCEDURE — 80053 COMPREHEN METABOLIC PANEL: CPT

## 2024-09-17 PROCEDURE — 80061 LIPID PANEL: CPT

## 2024-09-17 PROCEDURE — 85025 COMPLETE CBC W/AUTO DIFF WBC: CPT

## 2024-09-17 PROCEDURE — 84443 ASSAY THYROID STIM HORMONE: CPT

## 2024-09-18 LAB — PSA SERPL-MCNC: 0.34 NG/ML

## 2024-09-20 ENCOUNTER — APPOINTMENT (OUTPATIENT)
Dept: PHARMACY | Facility: HOSPITAL | Age: 74
End: 2024-09-20
Payer: COMMERCIAL

## 2024-09-20 DIAGNOSIS — E11.42 TYPE 2 DIABETES MELLITUS WITH DIABETIC POLYNEUROPATHY, WITHOUT LONG-TERM CURRENT USE OF INSULIN: ICD-10-CM

## 2024-09-20 RX ORDER — DAPAGLIFLOZIN 10 MG/1
10 TABLET, FILM COATED ORAL DAILY
Qty: 90 TABLET | Refills: 3 | Status: SHIPPED | OUTPATIENT
Start: 2024-09-20

## 2024-09-20 NOTE — PROGRESS NOTES
"Pharmacist Clinic: Diabetes   Initial Visit  Hollis Martin is a 74 y.o. male was referred to Clinical Pharmacy Team for diabetes management (Cost assistance for Farxiga).     Referring Provider: Oscar Bell MD    Subjective   HPI  Diagnosed with diabetes: >5 years ago  Known diabetes complications include peripheral neuropathy and obesity  Endocrinology provider? No  Diabetic Eye Exam:  not documented    Foot Exam:  not following podiatrist, checks feet daily   Does patient have proteinuria? No  Special needs/barriers to therapy: cost of Farxiga    Diabetes Pharmacotherapy   Farxiga 10 mg once daily   Metformin 1000 mg BID   Previous Diabetes Pharmacotherapy    Januvia -- cost        Adherence: Takes medication as directed and reports no missed doses  Affordability/Accessibility  Farxiga $93.46/month     Risk Reducing Meds  On ACEi/ARB: Yes   On Statin: Yes   On SGLT2i: Yes   On GLP1-RA: No     Glucose Monitoring:  not asked       Diet: not asked    Physical activity: not asked      Objective   Lab Review  Lab Results   Component Value Date    BILITOT 1.0 09/17/2024    CALCIUM 9.7 09/17/2024    CO2 29 09/17/2024     09/17/2024    CREATININE 0.88 09/17/2024    GLUCOSE 141 (H) 09/17/2024    ALKPHOS 79 09/17/2024    K 4.2 09/17/2024    PROT 7.5 09/17/2024     09/17/2024    AST 15 09/17/2024    ALT 15 09/17/2024    BUN 14 09/17/2024    ANIONGAP 14 09/17/2024    PHOS 4.3 06/11/2020    ALBUMIN 4.3 09/17/2024    GFRMALE >90 01/07/2022     Lab Results   Component Value Date    TRIG 68 09/17/2024    CHOL 111 09/17/2024    LDLCALC 59 09/17/2024    HDL 38.0 09/17/2024     POC HEMOGLOBIN A1c (%)   Date Value   04/03/2024 7.4 (A)   12/04/2023 6.9 (A)   08/02/2023 7.2 (A)     Hemoglobin A1C (%)   Date Value   09/17/2024 7.8 (H)     No components found for: \"UACR\"  There is no height or weight on file to calculate BMI.  Wt Readings from Last 3 Encounters:   08/13/24 113 kg (249 lb)   04/03/24 115 kg (254 lb) "   12/04/23 116 kg (255 lb)          The ASCVD Risk score (Yenny DE LEÓN, et al., 2019) failed to calculate for the following reasons:    The valid total cholesterol range is 130 to 320 mg/dL    Drug Interactions  No significant drug-drug interactions exist that require change in therapy.    Assessment/Plan   Problem List Items Addressed This Visit          Endocrine/Metabolic    Diabetes mellitus (Multi)    Relevant Medications    dapagliflozin propanediol (Farxiga) 10 mg     Patients diabetes is poorly controlled with most recent A1c of 7.8% (goal < 7 %). Glycemic control is estimated to be not at goal according to last A1c.     Patient referred for help with cost assistance of Farxiga. Patient has picked up a prescription on 9/15/24 so it will be too soon to fill the Farxiga until the next month.     Patient reports yearly income <400% FPL. Will have patient mail his social security document and will submit application to team in order to get the Farxiga at no cost.      Patient Assistance Screening (VAF)  Patient verbally reports monthly or yearly income which is less than 400% federal poverty level  Application for program has been submitted for the following medications:   Farxiga  Patient has been informed that program team will be reaching out to them to discuss necessary documentation, instructed to answer phone/return voicemail.   Patient aware this process may take up to 2 weeks once income documents have been sent to the team.  If approved, medication must be filled through [facility] pharmacy and may be picked up or mailed to patient.   If approved, medication will be billed through insurance, and patient assistance team will pay the copay. This will result in a $0 copay for the patient.  Counseled patient on mechanism of action, side effects, contraindications, and what to do if the patient misses a dose. All patients questions were answered.         Plan:  - Await social security documentation from  patient.   - Will send Farxiga 10 mg script to [facility] to begin application process.       Follow Up: 10/4/24 (2 weeks)  PCP Follow Up: 11/13/24    Nani Irving PharmD    Continue all meds under the continuation of care with the referring provider and clinical pharmacy team.

## 2024-09-22 LAB — METHYLMALONATE SERPL-SCNC: <0.1 UMOL/L (ref 0–0.4)

## 2024-10-04 ENCOUNTER — APPOINTMENT (OUTPATIENT)
Dept: PHARMACY | Facility: HOSPITAL | Age: 74
End: 2024-10-04
Payer: COMMERCIAL

## 2024-10-07 ENCOUNTER — APPOINTMENT (OUTPATIENT)
Dept: PHARMACY | Facility: HOSPITAL | Age: 74
End: 2024-10-07
Payer: COMMERCIAL

## 2024-10-07 DIAGNOSIS — E11.42 TYPE 2 DIABETES MELLITUS WITH DIABETIC POLYNEUROPATHY, WITHOUT LONG-TERM CURRENT USE OF INSULIN: Primary | ICD-10-CM

## 2024-10-07 DIAGNOSIS — I48.19 ATRIAL FIBRILLATION, PERSISTENT (MULTI): ICD-10-CM

## 2024-10-07 DIAGNOSIS — I48.19 ATRIAL FIBRILLATION, PERSISTENT (MULTI): Primary | ICD-10-CM

## 2024-10-07 PROCEDURE — RXMED WILLOW AMBULATORY MEDICATION CHARGE

## 2024-10-07 NOTE — PROGRESS NOTES
Patient Assistance Program (PAP):     We are pleased to inform you that your application for assistance has been approved.     This approval is valid through October 7, 2025  as long as the following criteria continue to be satisfied:    Your medication(s) (Farxiga, Eliquis) remains covered under your current insurance plan.  Your prescriber does not discontinue therapy.  You do not seek reimbursement from any other private or government-funded programs for the medication.    Under this program, the pharmacy will first bill your insurance plan for your indemnified specified medication. The [employer] (VAF) will then offset your copay balance, so that your out-of pocket expense for your specialty medication will be $0.00.    Nani Irving, EllenD

## 2024-10-10 ENCOUNTER — PHARMACY VISIT (OUTPATIENT)
Dept: PHARMACY | Facility: CLINIC | Age: 74
End: 2024-10-10
Payer: COMMERCIAL

## 2024-11-13 ENCOUNTER — APPOINTMENT (OUTPATIENT)
Dept: PRIMARY CARE | Facility: CLINIC | Age: 74
End: 2024-11-13
Payer: COMMERCIAL

## 2024-11-19 DIAGNOSIS — E11.9 DIABETES MELLITUS TYPE 2 WITHOUT RETINOPATHY (MULTI): ICD-10-CM

## 2024-11-19 DIAGNOSIS — I48.19 ATRIAL FIBRILLATION, PERSISTENT (MULTI): ICD-10-CM

## 2024-11-19 DIAGNOSIS — I10 PRIMARY HYPERTENSION: ICD-10-CM

## 2024-11-20 RX ORDER — AMLODIPINE BESYLATE 10 MG/1
10 TABLET ORAL DAILY
Qty: 90 TABLET | Refills: 3 | Status: SHIPPED | OUTPATIENT
Start: 2024-11-20 | End: 2025-11-15

## 2024-11-20 RX ORDER — ATORVASTATIN CALCIUM 40 MG/1
40 TABLET, FILM COATED ORAL DAILY
Qty: 90 TABLET | Refills: 3 | Status: SHIPPED | OUTPATIENT
Start: 2024-11-20 | End: 2025-11-15

## 2024-11-20 RX ORDER — METOPROLOL TARTRATE 50 MG/1
50 TABLET ORAL DAILY
Qty: 90 TABLET | Refills: 3 | Status: SHIPPED | OUTPATIENT
Start: 2024-11-20 | End: 2025-11-15

## 2024-11-20 RX ORDER — HYDROCHLOROTHIAZIDE 25 MG/1
25 TABLET ORAL DAILY
Qty: 90 TABLET | Refills: 3 | Status: SHIPPED | OUTPATIENT
Start: 2024-11-20 | End: 2025-11-15

## 2024-11-20 RX ORDER — LISINOPRIL 40 MG/1
40 TABLET ORAL DAILY
Qty: 90 TABLET | Refills: 3 | Status: SHIPPED | OUTPATIENT
Start: 2024-11-20 | End: 2025-11-15

## 2024-11-20 RX ORDER — METFORMIN HYDROCHLORIDE 1000 MG/1
1000 TABLET ORAL
Qty: 90 TABLET | Refills: 3 | Status: SHIPPED | OUTPATIENT
Start: 2024-11-20 | End: 2025-11-15

## 2024-11-22 ENCOUNTER — PHARMACY VISIT (OUTPATIENT)
Dept: PHARMACY | Facility: CLINIC | Age: 74
End: 2024-11-22
Payer: COMMERCIAL

## 2024-11-22 PROCEDURE — RXMED WILLOW AMBULATORY MEDICATION CHARGE

## 2024-12-09 ENCOUNTER — APPOINTMENT (OUTPATIENT)
Dept: PRIMARY CARE | Facility: CLINIC | Age: 74
End: 2024-12-09
Payer: COMMERCIAL

## 2025-01-02 DIAGNOSIS — G47.33 OSA (OBSTRUCTIVE SLEEP APNEA): ICD-10-CM

## 2025-01-02 PROCEDURE — 95810 POLYSOM 6/> YRS 4/> PARAM: CPT | Performed by: GENERAL PRACTICE

## 2025-01-07 PROCEDURE — RXMED WILLOW AMBULATORY MEDICATION CHARGE

## 2025-01-08 ENCOUNTER — PHARMACY VISIT (OUTPATIENT)
Dept: PHARMACY | Facility: CLINIC | Age: 75
End: 2025-01-08
Payer: COMMERCIAL

## 2025-02-12 PROCEDURE — RXMED WILLOW AMBULATORY MEDICATION CHARGE

## 2025-02-13 ENCOUNTER — PHARMACY VISIT (OUTPATIENT)
Dept: PHARMACY | Facility: CLINIC | Age: 75
End: 2025-02-13
Payer: COMMERCIAL

## 2025-03-04 ENCOUNTER — APPOINTMENT (OUTPATIENT)
Dept: PRIMARY CARE | Facility: CLINIC | Age: 75
End: 2025-03-04
Payer: COMMERCIAL

## 2025-03-06 ENCOUNTER — OFFICE VISIT (OUTPATIENT)
Dept: PRIMARY CARE | Facility: CLINIC | Age: 75
End: 2025-03-06
Payer: COMMERCIAL

## 2025-03-06 VITALS
BODY MASS INDEX: 39.02 KG/M2 | RESPIRATION RATE: 18 BRPM | HEART RATE: 79 BPM | WEIGHT: 253 LBS | OXYGEN SATURATION: 93 % | SYSTOLIC BLOOD PRESSURE: 142 MMHG | DIASTOLIC BLOOD PRESSURE: 87 MMHG

## 2025-03-06 DIAGNOSIS — E11.42 TYPE 2 DIABETES MELLITUS WITH DIABETIC POLYNEUROPATHY, WITHOUT LONG-TERM CURRENT USE OF INSULIN: Primary | ICD-10-CM

## 2025-03-06 DIAGNOSIS — I48.91 RAPID ATRIAL FIBRILLATION (MULTI): ICD-10-CM

## 2025-03-06 DIAGNOSIS — E66.01 OBESITY, MORBID (MULTI): ICD-10-CM

## 2025-03-06 DIAGNOSIS — C61 PROSTATE CANCER (MULTI): ICD-10-CM

## 2025-03-06 DIAGNOSIS — Z91.81 AT MAXIMUM RISK FOR FALL: ICD-10-CM

## 2025-03-06 PROBLEM — R06.83 SNORING: Status: RESOLVED | Noted: 2023-02-15 | Resolved: 2025-03-06

## 2025-03-06 PROBLEM — R31.0 GROSS HEMATURIA: Status: RESOLVED | Noted: 2023-02-15 | Resolved: 2025-03-06

## 2025-03-06 PROBLEM — R53.83 MALAISE AND FATIGUE: Status: RESOLVED | Noted: 2023-02-15 | Resolved: 2025-03-06

## 2025-03-06 PROBLEM — B35.1 ONYCHOMYCOSIS: Status: RESOLVED | Noted: 2023-02-15 | Resolved: 2025-03-06

## 2025-03-06 PROBLEM — M25.519 SHOULDER PAIN: Status: RESOLVED | Noted: 2023-02-15 | Resolved: 2025-03-06

## 2025-03-06 PROBLEM — L60.0 ONYCHOCRYPTOSIS: Status: RESOLVED | Noted: 2023-02-15 | Resolved: 2025-03-06

## 2025-03-06 PROBLEM — R36.1: Status: RESOLVED | Noted: 2023-02-15 | Resolved: 2025-03-06

## 2025-03-06 PROBLEM — E11.9 DIABETES MELLITUS (MULTI): Status: RESOLVED | Noted: 2023-02-15 | Resolved: 2025-03-06

## 2025-03-06 PROBLEM — R53.83 FATIGUE: Status: RESOLVED | Noted: 2023-02-15 | Resolved: 2025-03-06

## 2025-03-06 PROBLEM — H52.4 BILATERAL PRESBYOPIA: Status: RESOLVED | Noted: 2023-02-15 | Resolved: 2025-03-06

## 2025-03-06 PROBLEM — M25.569 JOINT PAIN, KNEE: Status: RESOLVED | Noted: 2023-02-15 | Resolved: 2025-03-06

## 2025-03-06 PROBLEM — R29.818 SUSPECTED SLEEP APNEA: Status: RESOLVED | Noted: 2023-02-15 | Resolved: 2025-03-06

## 2025-03-06 PROBLEM — G47.9 SLEEPING DIFFICULTY: Status: RESOLVED | Noted: 2023-02-15 | Resolved: 2025-03-06

## 2025-03-06 PROBLEM — S90.221A SUBUNGUAL HEMATOMA OF TOE, RIGHT, INITIAL ENCOUNTER: Status: RESOLVED | Noted: 2023-02-15 | Resolved: 2025-03-06

## 2025-03-06 PROBLEM — J06.9 URI, ACUTE: Status: RESOLVED | Noted: 2023-02-15 | Resolved: 2025-03-06

## 2025-03-06 PROBLEM — R30.9 URINARY PAIN: Status: RESOLVED | Noted: 2023-02-15 | Resolved: 2025-03-06

## 2025-03-06 PROBLEM — R53.81 MALAISE AND FATIGUE: Status: RESOLVED | Noted: 2023-02-15 | Resolved: 2025-03-06

## 2025-03-06 PROCEDURE — 3079F DIAST BP 80-89 MM HG: CPT | Performed by: FAMILY MEDICINE

## 2025-03-06 PROCEDURE — 1126F AMNT PAIN NOTED NONE PRSNT: CPT | Performed by: FAMILY MEDICINE

## 2025-03-06 PROCEDURE — 4010F ACE/ARB THERAPY RXD/TAKEN: CPT | Performed by: FAMILY MEDICINE

## 2025-03-06 PROCEDURE — 1159F MED LIST DOCD IN RCRD: CPT | Performed by: FAMILY MEDICINE

## 2025-03-06 PROCEDURE — 3077F SYST BP >= 140 MM HG: CPT | Performed by: FAMILY MEDICINE

## 2025-03-06 PROCEDURE — 1123F ACP DISCUSS/DSCN MKR DOCD: CPT | Performed by: FAMILY MEDICINE

## 2025-03-06 PROCEDURE — 99215 OFFICE O/P EST HI 40 MIN: CPT | Performed by: FAMILY MEDICINE

## 2025-03-06 RX ORDER — ASPIRIN 81 MG/1
81 TABLET ORAL DAILY
COMMUNITY

## 2025-03-06 ASSESSMENT — PAIN SCALES - GENERAL: PAINLEVEL_OUTOF10: 0-NO PAIN

## 2025-03-06 NOTE — PROGRESS NOTES
Subjective   Patient ID: Hollis Martin is a 75 y.o. male who presents for Follow-up (6 Month) and Diabetes.    HPI     Type 2 DM: 20 + years  Blood sugars are 140s   End organ damage: neuropathy: reduced proprioception, muscle weakness, and muscles pains: worried about falls      Review of Systems   All other systems reviewed and are negative.      Objective   /87 (BP Location: Left arm, Patient Position: Sitting, BP Cuff Size: Large adult)   Pulse 79   Resp 18   Wt 115 kg (253 lb)   SpO2 93%   BMI 39.02 kg/m²     Physical Exam  Vitals and nursing note reviewed.   Cardiovascular:      Rate and Rhythm: Normal rate and regular rhythm.   Pulmonary:      Effort: Pulmonary effort is normal.      Breath sounds: Normal breath sounds.   Musculoskeletal:      Cervical back: Neck supple.   Lymphadenopathy:      Cervical: No cervical adenopathy.   Neurological:      Mental Status: He is alert.   Psychiatric:         Mood and Affect: Mood normal.         Speech: Speech normal.         Behavior: Behavior normal.         Cognition and Memory: Cognition and memory normal.         Assessment/Plan   Diagnoses and all orders for this visit:  Type 2 diabetes mellitus with diabetic polyneuropathy, without long-term current use of insulin  -     semaglutide 0.25 mg or 0.5 mg (2 mg/3 mL) pen injector; Inject 0.25 mg under the skin 1 (one) time per week.  -     Comprehensive Metabolic Panel; Future  -     CBC and Auto Differential; Future  -     TSH; Future  Rapid atrial fibrillation (Multi)  Comments:  on rate control and anticogulation  Prostate cancer (Multi)  Comments:  brachytherapy, in remission in 2004  Obesity, morbid (Multi)  At maximum risk for fall  -     Referral to Physical Therapy; Future  Other orders  -     Follow Up In Primary Care - Established; Future

## 2025-03-07 DIAGNOSIS — I48.19 ATRIAL FIBRILLATION, PERSISTENT (MULTI): ICD-10-CM

## 2025-03-07 DIAGNOSIS — E78.00 PURE HYPERCHOLESTEROLEMIA: ICD-10-CM

## 2025-03-07 DIAGNOSIS — E66.01 OBESITY, MORBID (MULTI): ICD-10-CM

## 2025-03-07 DIAGNOSIS — G47.33 OSA ON CPAP: ICD-10-CM

## 2025-03-07 DIAGNOSIS — I10 PRIMARY HYPERTENSION: ICD-10-CM

## 2025-03-07 DIAGNOSIS — E11.9 DIABETES MELLITUS TYPE 2 WITHOUT RETINOPATHY (MULTI): Primary | ICD-10-CM

## 2025-03-07 DIAGNOSIS — E08.42 DIABETIC POLYNEUROPATHY ASSOCIATED WITH DIABETES MELLITUS DUE TO UNDERLYING CONDITION (MULTI): ICD-10-CM

## 2025-03-07 DIAGNOSIS — N52.9 MALE ERECTILE DISORDER OF ORGANIC ORIGIN: ICD-10-CM

## 2025-03-10 ENCOUNTER — APPOINTMENT (OUTPATIENT)
Dept: PHARMACY | Facility: HOSPITAL | Age: 75
End: 2025-03-10
Payer: COMMERCIAL

## 2025-03-10 DIAGNOSIS — E11.9 DIABETES MELLITUS TYPE 2 WITHOUT RETINOPATHY (MULTI): ICD-10-CM

## 2025-03-10 DIAGNOSIS — N52.9 MALE ERECTILE DISORDER OF ORGANIC ORIGIN: Primary | ICD-10-CM

## 2025-03-10 DIAGNOSIS — I10 PRIMARY HYPERTENSION: ICD-10-CM

## 2025-03-10 DIAGNOSIS — I48.19 ATRIAL FIBRILLATION, PERSISTENT (MULTI): ICD-10-CM

## 2025-03-10 DIAGNOSIS — E08.42 DIABETIC POLYNEUROPATHY ASSOCIATED WITH DIABETES MELLITUS DUE TO UNDERLYING CONDITION (MULTI): ICD-10-CM

## 2025-03-10 DIAGNOSIS — G47.33 OSA ON CPAP: ICD-10-CM

## 2025-03-10 DIAGNOSIS — E78.00 PURE HYPERCHOLESTEROLEMIA: ICD-10-CM

## 2025-03-10 DIAGNOSIS — E66.01 OBESITY, MORBID (MULTI): ICD-10-CM

## 2025-03-10 PROCEDURE — RXMED WILLOW AMBULATORY MEDICATION CHARGE

## 2025-03-10 RX ORDER — TIRZEPATIDE 2.5 MG/.5ML
2.5 INJECTION, SOLUTION SUBCUTANEOUS WEEKLY
Qty: 2 ML | Refills: 0 | Status: SHIPPED | OUTPATIENT
Start: 2025-03-10

## 2025-03-10 NOTE — PATIENT INSTRUCTIONS
Medication Changes:  CONTINUE:  Metformin 1g once daily  Atorvastatin  STOP  Aspirin  WAIT TO START  Mounjaro 2.5 mg once weekly  INCREASE  Apixaban to 5 mg BID    Patient Instructions  Make appt with podiatrist   Decrease carbs in diet and increase fiber in diet  Please get updated labs      Meal Planning (Meal Carbohydrate Choices Listed Beside in Bold)  30-45g Carbohydrate Breakfast Ideas (2-3 Carb Choices)  1 serving breakfast cereal (1) + 1/2 cup milk (1) + 1 fresh fruit (1)  1 english muffin (2) + 1 Tbsp peanut butter + 4 oz orange juice (1)  2 slices of toast (2) + 1 Tbsp jelly (1) + 2 scrambled eggs  Instant oatmeal with 2/3 cup milk (2.5) + handful of mixed berries (0.5)  [place] instant breakfast: 1 packet (2) + 1 cup any milk (1)  1 poptart (2.5) OR packet of Belvita crackers (2.5) (any flavor)   Subway: Any 6” breakfast sub (free condiments: oil, mustard, vinegar) (3)  Baldwin's: Fruit n' yogurt parfait (2-3) or 1 Mcmuffin (2-3) or 1 biscuit (2-3)  Lighter fare: 1 Quest protein bar (1.5) or 1 Chobani Flip yogurt (1.5)     45g Carbohydrate Lunch OR Dinner Ideas (3 Carb Choices)  1 cold cut sandwich (2 slices bread (2) + lunch meat) + fresh fruit (1)  1 hot dog (2) + 1/3 cup Bush's baked beans OR 1 oz chips (1) + almonds  1 grilled cheese (2 slices bread (2) + 2 slices cheese) + 1 V8 drink (1)  1 peanut butter & jelly: 2 slices bread (2) + 2 Tbsp PB + 1 Tbsp jelly (1)  1 to 1.5 cups homemade chili (2) + 5 saltine crackers (1) + cheese   Southwest salad (3 oz. chicken + 2/3 cup rice (2) + 1/2 cup black beans (1) + cheese, peppers, onions, tomatoes, avocado + 2 Tbsp Southwest ranch dressing   1 serving instant mac-n-cheese (2-3) + ½ cup cooked broccoli + 3 oz chicken  1 cup cooked spaghetti with sauce (3) + 3 oz chicken breast OR 1 meatball  1.5 slices of medium cheese or pepperoni pizza, regular crust (3)  Beef stir collins (3 oz beef + mixed veggies + 1 cup rice (3) + 2 tsp olive oil)  3 oz turkey + 1/3 cup  stuffing (1) + 1/2 cup mashed potato (1) + 1/2 cup corn (1)  Frozen meals:  Preferred options include (3-4) - Smart Ones, Healthy Choice, Lean Cuisine, Kashi, Bishnu's Fit Kitchen, Clay, Smart Maid  Subway: any 6” sub (except Meatball & Chicken Teriyaki) (3) with oil and vinegar  Baldwin's: hamburger OR 10-piece chicken nuggets (2) + ½ of small fries (1)  Taco Bell: 3 crunchy tacos OR 2 and 1/2 soft tacos (3) + small side salad   Chipotle: any bowl + rice (3) + fresh salsa + guacamole + cheese + sour cream OR any salad + beans (1.5) + “light” on rice (1.5) + fresh salsa + cheese + sour cream  [place]'s:  Combo meal - sandwich (3), diet drink, and salad (no charge substitution from fries to salad) OR sandwich with only ½ bun (1.5) and small collins (1.5) with diet drink     15-20g Dessert: You can substitute 1 food item from this list below (1 carb choice) as a dessert if you subtract 1 carb choice from a meal, even though these foods have added sugars or fat. Choose foods here less often. (Try to limit to 1 per day if used as a snack)   Candy: 3 pieces hard candy (i.e. Joness Quinones's) or 1 snack candy bar (i.e. Kit Yazmin)  Ice Cream: 1/2 cup regular ice cream or 1/3 cup frozen yogurt  Chocolate: 1 envelope hot chocolate or 2/3 Tamiment's milk chocolate bar  Cake/Muffins: 1-in square frosted cake (2-in unfrosted) or ¼ 4 oz muffin  Cookies: 1-2 Oreos or 1-2 fig newtons or 6 Nilla wafers or 2 Chips Ahoy cookies  <100 calorie products: 90-phyllis Fiber-One products or 100-phyllis Nabisco treat snacks    Healthier choice: 1/2 cup Halo Top ice cream or 1/2 84% Ghiradelli chocolate bar    15-20g Snacks: Try to limit to 1 per day if using as a snack.  Again, you can substitute 1 food item from this list below (1 carb choice) if you subtract 1 carb choice from a meal.  These foods also have added sugars or fat. Choose foods here less often.  Popcorn: 3.5 cups microwave (1/3 bag) or Skinny popcorn or 1.5 cups kettle corn  Chips: 1 oz  serving (Lay's, Sunchips, Dorito's, Frito's, Ruffles) +/- queso or salsa  Crackers: 1 serving (1/3 cup Combo's, 25 Cheezit's, 45 goldfish, 12 wheat thins)  Bars: 1 KIND bar or 1 Voodoo chewy granola bar or 1 [place] protein bar  Protein Bars: 1 Oh Snap protein bar or 1 Think Thin protein & fiber bar  Yogurt: Dannon, Yoplait, Activia light yogurt +/- handful mixed berries  Other: 1 cup chicken noodle soup or 1/4 cup dried fruit and nut mix or 15 grapes    Free Snacks (<5g Carbohydrate per Serving)  Nuts: 15 almonds or other plain nuts (i.e. pistachios, peanuts, cashews)  Raw Vegetables: 5 baby carrots or 5 cherry tomatoes or 5 green peppers  Fruit: 1/4 cup fresh berries (i.e. blueberries, blackberries, raspberries)  Dairy: 1 hard boiled egg or cheese cubes  Salty: 1 cup light popcorn or 2 saltine crackers   Savory: 3 celery sticks and 1 Tbsp peanut butter  Sweet: 1/2 cup sugar-free jello    Free Drink Options (<5g Carbohydrate per Serving)  “Zero” Drinks: Powerade Zero, Coke Zero, Pepsi Zero  “Diet” Drinks: Diet Coke, Diet Pepsi, Diet Ginger Ale, Diet 7-Up, Diet Cranberry Juice  Coffee with plain creamer +/- artificial sweetener (i.e. Sweet N' Low, Splenda)  Unsweetened Tea +/- artificial sweetener  Sparkling/Carbonated Water: [place], Dasani, Sparkling ICE, FIZZ  Vitamin-infused Water: [place] Vitamin Water  Flavored Water: Nestle Splash, Sobe, Propel, [place] FlavorSplash, Dasani, Hint, Crystal Light

## 2025-03-10 NOTE — PROGRESS NOTES
Clinical Pharmacy Appointment    Patient ID: [id number]  Hollis Martin is a 75 y.o. male who presents for First appointment. Reason for Referral:     Referring Provider and PCP: Oscar Bell MD   Last visit with PCP: 3/6/25   Type 2 DM: 20 + years   Blood sugars are 140s   End organ damage: neuropathy: reduced proprioception, muscle weakness, and muscles pains: worried about fall  Type 2 diabetes mellitus with diabetic polyneuropathy, without long-term current use of insulin  semaglutide 0.25 mg or 0.5 mg (2 mg/3 mL) pen injector; Inject 0.25 mg under the skin 1 (one) time per week.  Rapid atrial fibrillation (Multi) on rate control and anticogulation  Prostate cancer (Multi)  brachytherapy, in remission in 2004  Obesity, morbid (Multi)  At maximum risk for fall  Referral to Physical Therapy; Future  Next visit with PCP: 4/22/25    Subjective     Last Pharmacy Apt:    PAP enrollment  Date: 10/7/24    Past Medical History:   Diagnosis Date    Malignant neoplasm of prostate (Multi) 09/23/2022    Prostate cancer    Other conditions influencing health status 05/22/2013    Familial Combined Hyperlipidemia      Past Surgical History:   Procedure Laterality Date    COLONOSCOPY  10/28/2013    Complete Colonoscopy    PROSTATE SURGERY  03/11/2013    Prostate Surgery      Social Hx:   reports that he has quit smoking. His smoking use included cigarettes. He has never been exposed to tobacco smoke. He has never used smokeless tobacco. He reports current alcohol use. He reports that he does not use drugs.   Uses cane   Right leg gives out    Family Hx:  family history includes Diabetes in his father and mother; Hypertension in an other family member.     HPI    ATRIAL FIBRILLATION persistent   Does patient follow with cardiology? no  Last cardiology appointment: never    Current atrial fibrillations medications include:  Rate Control: metoprolol  Anticoagulation: eliquis    Clarifications to above regimen: none    Adverse Effects: none     Screening for dose adjustment:  Recommended to increase dose from 2.5mg BID to 5mg BID    Diagnosis:  Onset: 2018  Patient is projected to be on anticoagulation indefinitely     Appropriateness and Safety Analysis  HAS BLEED - 1 points  Risk was 3.4% in one validation study (Lip ) and 1.02 bleeds per 100 patient-years in another validation study (Pisrobyn 2010).  Prior major bleeding or predisposition to bleedin   Could consider one as he had a hematoma on his toe >5 years ago, may have kathryn prior to afib - unclear  Labile INR or Unstable/high INRs, time in therapeutic range <60%   Age >65: 1  Medication usage predisposing to bleeding; Aspirin, clopidogrel, NSAIDs: 0  Has not been taking aspirin for quite some time  Alcohol use >=8 drinks/week: 0  [id number] Score: 4 points  Stroke risk was 4.8% per year in >90,000 patients (the [place] Atrial Fibrillation Cohort Study) and 6.7% risk of stroke/TIA/systemic embolism.  Atrial fibrillation guidelines recommend no anticoagulation if the score is zero, and full anticoagulation if it is 2 or greater.   Age: [<65 (0)] [65-74 (+1)] > 75 (+2): 75  Sex: [Male/Female (+1)]: male  CHF history: [No/Yes(+1)]: No  Hypertension history: (+1): yes  Stroke/TIA/thromboembolism history: No   Vascular disease history (prior MI, peripheral artery disease, aortic plaque): No  Diabetes history: (+1): yes    Pertinent Medical History  Medical history: Previous Falls   One fall documented on 2018  Social history:  no risk factors  Medication history: Antiplatelet Therapy    Monitoring:  Date of last BMP: 2024  Last echo:   Recent Hospitalizations: none in chart since   Recent Falls/Trauma:  Patient is worried about falls per PCP notes  He denies falling in past year  24 - Initial Fall Risk Screening:    Hollis has not fallen in the last 6 months. his did not result in injury. Hollis does not have a fear of falling. He does not need  assistance with sitting, standing, or walking. he does not need assistance walking in his home. he does not need assistance in an unfamiliar setting. The patient isusing an assistive device.   Changes in Tobacco or Alcohol Intake: none, former smoker     DIABETES MELLITUS TYPE 2  Diagnosed with diabetes:  2004  Known diabetic complications: peripheral neuropathy, impotence, and obesity  Does patient follow with Endocrinology: No  Last optometry exam: 3 years  Most recent visit in Podiatry: >3 years  Appt in April  patient denies sores or cuts on feet today     Current diabetic medications include:  Metformin 1g daily   Farxiga 10 mg   Clarifications to above regimen: none  Adverse Effects: denies gi adrs, says he may be urinating more but is not sure    Past diabetic medications include:  Januvia - cost    Glucose Readings: BGs are high in the morning  Glucometer/CGM Type: acucheck and Relion prime  Patient tests BG 1x time per day - FBG  Current home BG readings:  FBG: Range (135 - 177); Average (140); # of readings above goal:     HYPOGLYCEMIA  Any episodes of hypoglycemia? No, denies .    Did patient treat episode of hypoglycemia appropriately? N/A   Does the patient have a prescription for ready-to-use Glucagon? Not on insulin  Does pt have proteinuria? No    BMI: 39.02; >/= 35? Yes, Class  Current Weight:  253 lbs    Lifestyle:  Diet: 1-2 meals/day.   Portion size  BK: cereal  LN: Baked chicken, salad, etc   Snacks: potato chips, fruit  Drinks: water  poor compliance with carbohydrate counting, poor intake fiber rich food sources, high fat food choices, and large portions  Physical Activity:   Walks a couple of blocks - 2x a week    Secondary Prevention:  Statin? Yes  ACE-I/ARB? Yes  Aspirin? Not at this time    Pertinent PMH Review:  PMH of Pancreatitis: No  PMH of Retinopathy: No  PMH of Urinary Tract Infections: No  PMH of MTC: No  HF:  No  _____________________________________________________________________________    Allergies   Reviewed? Yes  Changes? No    Medication Reconciliation:  Changed: none    PHARMACIST MEDICATION REVIEW    Drug Interactions  The following drug interactions were noted:    Concurrent use of apixaban with aspirin may increase the risk of bleeding.     Medication System Management  Patient's preferred pharmacy:   [facility] for  PAP  Eliquis  Farxiga  Ozempic  Insurance - mail order w/90 day supply  Adherence/Medication Access: satisfactory   Stopped taking aspirin  Describe actions taken to mitigate barriers: [facility] application for glp-1ra  _____________________________________________________________________________    Labs  Hepatic dysfunction?  No, LFTs WNL  Kidney dysfunction?  No, RFP ; GFR: 90  Does pt have proteinuria? no uacr   Last BMP? DUE; Last A1c: DUE; Date: September 2024  _____________________________________________________________________________    Objective   No Known Allergies  Social History     Social History Narrative    Not on file      Medication Review  Current Outpatient Medications   Medication Instructions    amLODIPine (NORVASC) 10 mg, oral, Daily    aspirin 81 mg, oral, Daily    atorvastatin (LIPITOR) 40 mg, oral, Daily    blood sugar diagnostic (Accu-Chek Guide test strips) strip USE (ACCUCHEK GUIDE) TEST STRIPS TO TEST BLOOD SUGAR TWICE A DAY AS DIRECTED    Eliquis 2.5 mg, oral, 2 times daily    Farxiga 10 mg, oral, Daily    fish oil concentrate (Omega-3) 120-180 mg capsule 3 capsules, Daily    hydroCHLOROthiazide (HYDRODIURIL) 25 mg, oral, Daily    lancets misc Check sugar once daily    lisinopril 40 mg, oral, Daily    metFORMIN (GLUCOPHAGE) 1,000 mg, oral, Daily with breakfast    metoprolol tartrate (LOPRESSOR) 50 mg, oral, Daily    semaglutide 0.25 mg, subcutaneous, Weekly      Vitals  BP Readings from Last 2 Encounters:   03/06/25 142/87   11/27/24 (!) 126/93     BMI Readings from Last  1 Encounters:   03/06/25 39.02 kg/m²      Labs  A1C  Lab Results   Component Value Date    HGBA1C 7.8 (H) 09/17/2024    HGBA1C 7.4 (A) 04/03/2024    HGBA1C 6.9 (A) 12/04/2023     BMP  Lab Results   Component Value Date    CALCIUM 9.7 09/17/2024     09/17/2024    K 4.2 09/17/2024    CO2 29 09/17/2024     09/17/2024    BUN 14 09/17/2024    CREATININE 0.88 09/17/2024    EGFR 90 09/17/2024     LFTs  Lab Results   Component Value Date    ALT 15 09/17/2024    AST 15 09/17/2024    ALKPHOS 79 09/17/2024    BILITOT 1.0 09/17/2024     FLP  Lab Results   Component Value Date    TRIG 68 09/17/2024    CHOL 111 09/17/2024    LDLCALC 59 09/17/2024    HDL 38.0 09/17/2024       Weight Management  Wt Readings from Last 3 Encounters:   03/06/25 115 kg (253 lb)   08/13/24 113 kg (249 lb)   04/03/24 115 kg (254 lb)      There is no height or weight on file to calculate BMI.   _____________________________________________________________________________    Assessment/Plan   Problem List Items Addressed This Visit       Atrial fibrillation, persistent (Multi)    Diabetes mellitus type 2 without retinopathy (Multi)    Diabetic neuropathy (Multi)    Hypertension    Hyperlipidemia    JOSHUA on CPAP    Obesity, morbid (Multi)      Patient Assistance Screening (VAF)  Patient verbally reports monthly or yearly income which is less than 400% federal poverty level  Application for program has been submitted for the following medications:   Mounjaro  Patient aware this process may take up to 2 weeks once income documents have been sent to the team.  If approved, medication must be filled through [facility] pharmacy and may be picked up or mailed to patient.   If approved, medication will be billed through insurance, and patient assistance team will pay the copay. This will result in a $0 copay for the patient.  Counseled patient on mechanism of action, side effects, contraindications, and what to do if the patient misses a dose. All  patients questions were answered.      Patient Goals  Fasting B - 130 mg/dL  Postprandial BG: less than 180 mg/dL  A1c: less than 7%;  Is pt at goal? No, above ; A1C: 7.8 on   Previous  Patient's SMBGs are above goal.     DIABETES MELLITUS TYPE 2 PLAN  Diabetes mellitus Type II, under unsatisfactory control.  Reinforced healthy diet, lifestyle, and exercise.  Prescription medication ordered.  Lab work ordered.  Rationale:   Candidate for GLP? Yes; Switch ozempic to Tirzepatide as it has an indication for sleep apnea and patient is moderate risk for MACE with no prior events (Evidence for use in high risk)  Candidate for SGLT2? Yes; Farxiga 10 mg  Candidate for DPP4? Not at this time; not with glp  Candidate for Metformin? Yes; but he is not on max dose; he denies ever having adrs  Candidate for Insulin? No; A1c >/= 11? No  Patient does not meet criteria for apixaban dosing - and is moderate-high risk for CVD event  Aspirin is no longer indicated for primary prevention. Patient is elderly and the risk of bleed outweighs the benefit. The patient has also not even been taking the aspirin in months    Medication Changes:  CONTINUE:  Metformin 1g once daily  Atorvastatin  STOP  Aspirin  WAIT TO START  Mounjaro 2.5 mg once weekly  INCREASE  Apixaban to 5 mg BID  Atrial fibrillation:  5 mg twice daily unless patient has any 2 of the following:   then reduce dose to 2.5 mg   Age >=80 years -NO  Patient is 75 years of age  body weight <=60 kg - NO  Patient's weight is 115 kg  serum creatinine >=1.5 mg/dL - NO   Per EMR review the patient has never had Scr >=1.5    Future Considerations:  Repeat lipid - discontinue fish oil as it is large pill burden with minimal TG lowering effects. TG and LDL-C is at goal  Increase metformin or discontinue metformin if BG are optimized    Monitoring  Bleeding  ADRs  Weight, SMBG, Hba1c  Renal and liver function, CBC      Patient Instructions  Decrease carbs in diet and increase  fiber in diet  Consider taking statin again  Please get updated labs    Education:   Diet: Meal Planning (Meal Carbohydrate Choices)  Exercise  Discussed foot care.  Reminded to get yearly retinal exam.  Reminded to bring in blood sugar diary at next visit.  Zakia  Counseled patient on Mounjaro MOA, expectations, side effects, duration of therapy, administration, and monitoring parameters.  Provided detailed dosing and administration counseling to ensure proper technique.   Reviewed Mounjaro titration schedule, starting with 2.5 mg once weekly to a goal of 15 mg once weekly if tolerated  Counseled patient on the benefits of GLP-1ra glycemic control and weight loss  Reviewed storage requirements of Mounjaro when not in use, and when to administer the medication if a dose is missed.  Advised patient that they may experience improved satiety after meals and portion sizes of meals may be reduced as doses of Mounjaro increase.     Clinical Pharmacist follow-up: 4 days, Telehealth visit    Continue all meds under the continuation of care with the referring provider and clinical pharmacy team.    Thank you,  Josselin Marvin, PharmD   Clinical Pharmacist Specialist, Primary Care   Phone: [phone]   Fax: [phone]   Email: [email or web address]    Verbal consent to manage patient's drug therapy was obtained from the patient. They were informed they may decline to participate or withdraw from participation in pharmacy services at any time

## 2025-03-11 DIAGNOSIS — I10 PRIMARY HYPERTENSION: ICD-10-CM

## 2025-03-11 DIAGNOSIS — I48.19 ATRIAL FIBRILLATION, PERSISTENT (MULTI): ICD-10-CM

## 2025-03-11 PROCEDURE — RXMED WILLOW AMBULATORY MEDICATION CHARGE

## 2025-03-11 RX ORDER — METOPROLOL TARTRATE 50 MG/1
50 TABLET ORAL DAILY
Qty: 90 TABLET | Refills: 0 | Status: SHIPPED | OUTPATIENT
Start: 2025-03-11 | End: 2026-03-06

## 2025-03-12 ENCOUNTER — PHARMACY VISIT (OUTPATIENT)
Dept: PHARMACY | Facility: CLINIC | Age: 75
End: 2025-03-12
Payer: COMMERCIAL

## 2025-03-14 ENCOUNTER — TELEMEDICINE (OUTPATIENT)
Dept: PHARMACY | Facility: HOSPITAL | Age: 75
End: 2025-03-14
Payer: COMMERCIAL

## 2025-03-14 ENCOUNTER — PHARMACY VISIT (OUTPATIENT)
Dept: PHARMACY | Facility: CLINIC | Age: 75
End: 2025-03-14
Payer: COMMERCIAL

## 2025-03-14 DIAGNOSIS — E08.42 DIABETIC POLYNEUROPATHY ASSOCIATED WITH DIABETES MELLITUS DUE TO UNDERLYING CONDITION (MULTI): ICD-10-CM

## 2025-03-14 DIAGNOSIS — G47.33 OSA ON CPAP: ICD-10-CM

## 2025-03-14 DIAGNOSIS — E11.9 DIABETES MELLITUS TYPE 2 WITHOUT RETINOPATHY (MULTI): Primary | ICD-10-CM

## 2025-03-14 NOTE — PROGRESS NOTES
Clinical Pharmacy Appointment    Patient ID: [id number]  Hollis Martin is a 75 y.o. male who presents for First appointment. Reason for Referral:     Referring Provider and PCP: Oscar Bell MD   Last visit with PCP: 3/6/25   Type 2 DM: 20 + years   Blood sugars are 140s   End organ damage: neuropathy: reduced proprioception, muscle weakness, and muscles pains: worried about fall  Type 2 diabetes mellitus with diabetic polyneuropathy, without long-term current use of insulin  semaglutide 0.25 mg or 0.5 mg (2 mg/3 mL) pen injector; Inject 0.25 mg under the skin 1 (one) time per week.  Rapid atrial fibrillation (Multi) on rate control and anticogulation  Prostate cancer (Multi)  brachytherapy, in remission in 2004  Obesity, morbid (Multi)  At maximum risk for fall  Referral to Physical Therapy; Future  Next visit with PCP: 4/22/25    Subjective     Last Pharmacy Apt:    PAP enrollment  Date: 10/7/24    Past Medical History:   Diagnosis Date    Malignant neoplasm of prostate (Multi) 09/23/2022    Prostate cancer    Other conditions influencing health status 05/22/2013    Familial Combined Hyperlipidemia      Past Surgical History:   Procedure Laterality Date    COLONOSCOPY  10/28/2013    Complete Colonoscopy    PROSTATE SURGERY  03/11/2013    Prostate Surgery      Social Hx:   reports that he has quit smoking. His smoking use included cigarettes. He has never been exposed to tobacco smoke. He has never used smokeless tobacco. He reports current alcohol use. He reports that he does not use drugs.   Uses cane   Right leg gives out    Family Hx:  family history includes Diabetes in his father and mother; Hypertension in an other family member.     HPI    ATRIAL FIBRILLATION persistent   Does patient follow with cardiology? no  Last cardiology appointment: never    Current atrial fibrillations medications include:  Rate Control: metoprolol  Anticoagulation: eliquis    Clarifications to above regimen: none    Adverse Effects: none     Screening for dose adjustment:  Recommended to increase dose from 2.5mg BID to 5mg BID    Diagnosis:  Onset: 2018  Patient is projected to be on anticoagulation indefinitely     Appropriateness and Safety Analysis  HAS BLEED - 1 points  Risk was 3.4% in one validation study (Lip ) and 1.02 bleeds per 100 patient-years in another validation study (Pisrobyn 2010).  Prior major bleeding or predisposition to bleedin   Could consider one as he had a hematoma on his toe >5 years ago, may have kathryn prior to afib - unclear  Labile INR or Unstable/high INRs, time in therapeutic range <60%   Age >65: 1  Medication usage predisposing to bleeding; Aspirin, clopidogrel, NSAIDs: 0  Has not been taking aspirin for quite some time  Alcohol use >=8 drinks/week: 0  [id number] Score: 4 points  Stroke risk was 4.8% per year in >90,000 patients (the [place] Atrial Fibrillation Cohort Study) and 6.7% risk of stroke/TIA/systemic embolism.  Atrial fibrillation guidelines recommend no anticoagulation if the score is zero, and full anticoagulation if it is 2 or greater.   Age: [<65 (0)] [65-74 (+1)] > 75 (+2): 75  Sex: [Male/Female (+1)]: male  CHF history: [No/Yes(+1)]: No  Hypertension history: (+1): yes  Stroke/TIA/thromboembolism history: No   Vascular disease history (prior MI, peripheral artery disease, aortic plaque): No  Diabetes history: (+1): yes    Pertinent Medical History  Medical history: Previous Falls   One fall documented on 2018  Social history:  no risk factors  Medication history: Antiplatelet Therapy    Monitoring:  Date of last BMP: 2024  Last echo:   Recent Hospitalizations: none in chart since   Recent Falls/Trauma:  Patient is worried about falls per PCP notes  He denies falling in past year  24 - Initial Fall Risk Screening:    Hollis has not fallen in the last 6 months. his did not result in injury. Hollis does not have a fear of falling. He does not need  assistance with sitting, standing, or walking. he does not need assistance walking in his home. he does not need assistance in an unfamiliar setting. The patient isusing an assistive device.   Changes in Tobacco or Alcohol Intake: none, former smoker     DIABETES MELLITUS TYPE 2  Diagnosed with diabetes:  2004  Known diabetic complications: peripheral neuropathy, impotence, and obesity  Does patient follow with Endocrinology: No  Last optometry exam: 3 years  Most recent visit in Podiatry: >3 years  Appt in April  patient denies sores or cuts on feet today     Current diabetic medications include:  Metformin 1g daily   Farxiga 10 mg   Clarifications to above regimen: none  Adverse Effects: denies gi adrs, says he may be urinating more but is not sure    Past diabetic medications include:  Januvia - cost    Glucose Readings: BGs are high in the morning  Glucometer/CGM Type: acucheck and Relion prime  Patient tests BG 1x time per day - FBG  Current home BG readings:  FBG: Range (135 - 177); Average (140); # of readings above goal:     HYPOGLYCEMIA  Any episodes of hypoglycemia? No, denies .    Did patient treat episode of hypoglycemia appropriately? N/A   Does the patient have a prescription for ready-to-use Glucagon? Not on insulin  Does pt have proteinuria? No    BMI: 39.02; >/= 35? Yes, Class  Current Weight:  253 lbs    Lifestyle:  Diet: 1-2 meals/day.   Portion size  BK: cereal  LN: Baked chicken, salad, etc   Snacks: potato chips, fruit  Drinks: water  poor compliance with carbohydrate counting, poor intake fiber rich food sources, high fat food choices, and large portions  Physical Activity:   Walks a couple of blocks - 2x a week    Secondary Prevention:  Statin? Yes  ACE-I/ARB? Yes  Aspirin? Not at this time    Pertinent PMH Review:  PMH of Pancreatitis: No  PMH of Retinopathy: No  PMH of Urinary Tract Infections: No  PMH of MTC: No  HF:  No  _____________________________________________________________________________    Allergies   Reviewed? Yes  Changes? No    Medication Reconciliation:  Changed: none    PHARMACIST MEDICATION REVIEW    Drug Interactions  The following drug interactions were noted:    Concurrent use of apixaban with aspirin may increase the risk of bleeding.     Medication System Management  Patient's preferred pharmacy:   [facility] for  PAP  Eliquis  Farxiga  Ozempic  Insurance - mail order w/90 day supply  Adherence/Medication Access: satisfactory   Stopped taking aspirin  Describe actions taken to mitigate barriers: [facility] application for glp-1ra  _____________________________________________________________________________    Labs  Hepatic dysfunction?  No, LFTs WNL  Kidney dysfunction?  No, RFP ; GFR: 90  Does pt have proteinuria? no uacr   Last BMP? DUE; Last A1c: DUE; Date: September 2024  _____________________________________________________________________________    Objective   No Known Allergies  Social History     Social History Narrative    Not on file      Medication Review  Current Outpatient Medications   Medication Instructions    amLODIPine (NORVASC) 10 mg, oral, Daily    atorvastatin (LIPITOR) 40 mg, oral, Daily    blood sugar diagnostic (Accu-Chek Guide test strips) strip USE (ACCUCHEK GUIDE) TEST STRIPS TO TEST BLOOD SUGAR TWICE A DAY AS DIRECTED    Eliquis 5 mg, oral, 2 times daily    Farxiga 10 mg, oral, Daily    fish oil concentrate (Omega-3) 120-180 mg capsule 3 capsules, Daily    hydroCHLOROthiazide (HYDRODIURIL) 25 mg, oral, Daily    lancets misc Check sugar once daily    lisinopril 40 mg, oral, Daily    metFORMIN (GLUCOPHAGE) 1,000 mg, oral, Daily with breakfast    metoprolol tartrate (LOPRESSOR) 50 mg, oral, Daily    Mounjaro 2.5 mg, subcutaneous, Weekly      Vitals  BP Readings from Last 2 Encounters:   03/06/25 142/87   11/27/24 (!) 126/93     BMI Readings from Last 1 Encounters:   03/06/25 39.02  kg/m²      Labs  A1C  Lab Results   Component Value Date    HGBA1C 7.8 (H) 2024    HGBA1C 7.4 (A) 2024    HGBA1C 6.9 (A) 2023     BMP  Lab Results   Component Value Date    CALCIUM 9.7 2024     2024    K 4.2 2024    CO2 29 2024     2024    BUN 14 2024    CREATININE 0.88 2024    EGFR 90 2024     LFTs  Lab Results   Component Value Date    ALT 15 2024    AST 15 2024    ALKPHOS 79 2024    BILITOT 1.0 2024     FLP  Lab Results   Component Value Date    TRIG 68 2024    CHOL 111 2024    LDLCALC 59 2024    HDL 38.0 2024       Weight Management  Wt Readings from Last 3 Encounters:   25 115 kg (253 lb)   24 113 kg (249 lb)   24 115 kg (254 lb)      There is no height or weight on file to calculate BMI.   _____________________________________________________________________________    Assessment/Plan   Problem List Items Addressed This Visit    None     Patient Assistance Program Approval:   We are pleased to inform you that your application for assistance has been approved.   This approval is valid through  10/7/25  as long as the following criteria continue to be satisfied:  Your medication (Mounjaro) remains covered under your current insurance plan.  Your prescriber does not discontinue therapy.  You do not seek reimbursement from any other private or government-funded programs for the medication.  Under this program, the pharmacy will first bill your insurance plan for your indemnified specified medication. The [employer] will then offset your copay balance, so that your out-of pocket expense for your specialty medication will be $0.00.    Josselin Marvin PharmD     Patient Goals  Fasting B - 130 mg/dL  Postprandial BG: less than 180 mg/dL  A1c: less than 7%;  Is pt at goal? No, above ; A1C: 7.8 on   Previous  Patient's SMBGs are above goal.      DIABETES MELLITUS TYPE 2 PLAN  Diabetes mellitus Type II, under unsatisfactory control.  Reinforced healthy diet, lifestyle, and exercise.  Prescription medication ordered.  Lab work ordered.  Rationale:   Candidate for GLP? Yes; Switch ozempic to Tirzepatide as it has an indication for sleep apnea and patient is moderate risk for MACE with no prior events (Evidence for use in high risk)  Candidate for SGLT2? Yes; Farxiga 10 mg  Candidate for DPP4? Not at this time; not with glp  Candidate for Metformin? Yes; but he is not on max dose; he denies ever having adrs  Candidate for Insulin? No; A1c >/= 11? No  Patient does not meet criteria for apixaban dosing - and is moderate-high risk for CVD event  Aspirin is no longer indicated for primary prevention. Patient is elderly and the risk of bleed outweighs the benefit. The patient has also not even been taking the aspirin in months    Medication Changes:  CONTINUE:  Metformin 1g once daily  Atorvastatin  Apixaban 5 mg BID  WAIT TO START  Mounjaro 2.5 mg once weekly  INCREASE (at next appt)  Metformin 1500 mg   Future Considerations:  Repeat lipid - discontinue fish oil as it is large pill burden with minimal TG lowering effects. TG and LDL-C is at goal  Increase metformin or discontinue metformin if BG are optimized    Monitoring  Bleeding  ADRs  Weight, SMBG, Hba1c  Renal and liver function, CBC    Patient Instructions  Decrease carbs in diet and increase fiber in diet  Consider taking statin again  Please get updated labs    Education:   Diet: Meal Planning (Meal Carbohydrate Choices)  Exercise  Discussed foot care.  Reminded to get yearly retinal exam.  Reminded to bring in blood sugar diary at next visit.  [place]  Counseled patient on Mounjaro MOA, expectations, side effects, duration of therapy, administration, and monitoring parameters.  Provided detailed dosing and administration counseling to ensure proper technique.   Reviewed Mounjaro titration schedule,  starting with 2.5 mg once weekly to a goal of 15 mg once weekly if tolerated  Counseled patient on the benefits of GLP-1ra glycemic control and weight loss  Reviewed storage requirements of Mounjaro when not in use, and when to administer the medication if a dose is missed.  Advised patient that they may experience improved satiety after meals and portion sizes of meals may be reduced as doses of Mounjaro increase.     Clinical Pharmacist follow-up: Next Tuesday, Telehealth visit  Restart statin?  Increase metformin   Review medications changes  Last appt: stop aspirin, increase eliquis  Next appt  Start mounjaro  Increase metformin  Discuss Mounjaro administration (discussed both ozempic and mounjaro at last appt)  Patient should have medication delivered by Tuesday    Continue all meds under the continuation of care with the referring provider and clinical pharmacy team.    Thank you,  Josselin Marvin, PharmD   Clinical Pharmacist Specialist, Primary Care   Phone: [phone]   Fax: [phone]   Email: [email or web address]    Verbal consent to manage patient's drug therapy was obtained from the patient. They were informed they may decline to participate or withdraw from participation in pharmacy services at any time

## 2025-03-18 ENCOUNTER — APPOINTMENT (OUTPATIENT)
Dept: PHARMACY | Facility: HOSPITAL | Age: 75
End: 2025-03-18
Payer: COMMERCIAL

## 2025-03-18 DIAGNOSIS — E78.00 PURE HYPERCHOLESTEROLEMIA: ICD-10-CM

## 2025-03-18 DIAGNOSIS — N52.9 MALE ERECTILE DISORDER OF ORGANIC ORIGIN: ICD-10-CM

## 2025-03-18 DIAGNOSIS — E08.42 DIABETIC POLYNEUROPATHY ASSOCIATED WITH DIABETES MELLITUS DUE TO UNDERLYING CONDITION (MULTI): ICD-10-CM

## 2025-03-18 DIAGNOSIS — E11.9 DIABETES MELLITUS TYPE 2 WITHOUT RETINOPATHY (MULTI): Primary | ICD-10-CM

## 2025-03-18 DIAGNOSIS — I48.19 ATRIAL FIBRILLATION, PERSISTENT (MULTI): ICD-10-CM

## 2025-03-18 DIAGNOSIS — I10 PRIMARY HYPERTENSION: ICD-10-CM

## 2025-03-18 DIAGNOSIS — E11.42 TYPE 2 DIABETES MELLITUS WITH DIABETIC POLYNEUROPATHY, WITHOUT LONG-TERM CURRENT USE OF INSULIN: ICD-10-CM

## 2025-03-18 DIAGNOSIS — G47.33 OSA ON CPAP: ICD-10-CM

## 2025-03-18 NOTE — PROGRESS NOTES
Clinical Pharmacy Appointment    Patient ID: [id number]  Hollis Martin is a 75 y.o. male who presents for First appointment. Reason for Referral:     Referring Provider and PCP: Oscar Bell MD   Last visit with PCP: 3/6/25   Type 2 DM: 20 + years   Blood sugars are 140s   End organ damage: neuropathy: reduced proprioception, muscle weakness, and muscles pains: worried about fall  Type 2 diabetes mellitus with diabetic polyneuropathy, without long-term current use of insulin  semaglutide 0.25 mg or 0.5 mg (2 mg/3 mL) pen injector; Inject 0.25 mg under the skin 1 (one) time per week.  Rapid atrial fibrillation (Multi) on rate control and anticogulation  Prostate cancer (Multi)  brachytherapy, in remission in 2004  Obesity, morbid (Multi)  At maximum risk for fall  Referral to Physical Therapy; Future  Next visit with PCP: 4/22/25    Subjective     Last Pharmacy Apt:    PAP enrollment  Date: 10/7/24    Past Medical History:   Diagnosis Date    Malignant neoplasm of prostate (Multi) 09/23/2022    Prostate cancer    Other conditions influencing health status 05/22/2013    Familial Combined Hyperlipidemia      Past Surgical History:   Procedure Laterality Date    COLONOSCOPY  10/28/2013    Complete Colonoscopy    PROSTATE SURGERY  03/11/2013    Prostate Surgery      Social Hx:   reports that he has quit smoking. His smoking use included cigarettes. He has never been exposed to tobacco smoke. He has never used smokeless tobacco. He reports current alcohol use. He reports that he does not use drugs.   Uses cane   Right leg gives out    Family Hx:  family history includes Diabetes in his father and mother; Hypertension in an other family member.     HPI    ATRIAL FIBRILLATION persistent   Does patient follow with cardiology? no  Last cardiology appointment: never    Current atrial fibrillations medications include:  Rate Control: metoprolol  Anticoagulation: eliquis    Clarifications to above regimen: none    Adverse Effects: none     Screening for dose adjustment:  Recommended to increase dose from 2.5mg BID to 5mg BID    Diagnosis:  Onset: 2018  Patient is projected to be on anticoagulation indefinitely     Appropriateness and Safety Analysis  HAS BLEED - 1 points  Risk was 3.4% in one validation study (Lip ) and 1.02 bleeds per 100 patient-years in another validation study (Pisrobyn 2010).  Prior major bleeding or predisposition to bleedin   Could consider one as he had a hematoma on his toe >5 years ago, may have kathryn prior to afib - unclear  Labile INR or Unstable/high INRs, time in therapeutic range <60%   Age >65: 1  Medication usage predisposing to bleeding; Aspirin, clopidogrel, NSAIDs: 0  Has not been taking aspirin for quite some time  Alcohol use >=8 drinks/week: 0  [id number] Score: 4 points  Stroke risk was 4.8% per year in >90,000 patients (the [place] Atrial Fibrillation Cohort Study) and 6.7% risk of stroke/TIA/systemic embolism.  Atrial fibrillation guidelines recommend no anticoagulation if the score is zero, and full anticoagulation if it is 2 or greater.   Age: [<65 (0)] [65-74 (+1)] > 75 (+2): 75  Sex: [Male/Female (+1)]: male  CHF history: [No/Yes(+1)]: No  Hypertension history: (+1): yes  Stroke/TIA/thromboembolism history: No   Vascular disease history (prior MI, peripheral artery disease, aortic plaque): No  Diabetes history: (+1): yes    Pertinent Medical History  Medical history: Previous Falls   One fall documented on 2018  Social history:  no risk factors  Medication history: Antiplatelet Therapy    Monitoring:  Date of last BMP: 2024  Last echo:   Recent Hospitalizations: none in chart since   Recent Falls/Trauma:  Patient is worried about falls per PCP notes  He denies falling in past year  24 - Initial Fall Risk Screening:    Hollis has not fallen in the last 6 months. his did not result in injury. Hollis does not have a fear of falling. He does not need  assistance with sitting, standing, or walking. he does not need assistance walking in his home. he does not need assistance in an unfamiliar setting. The patient isusing an assistive device.   Changes in Tobacco or Alcohol Intake: none, former smoker     DIABETES MELLITUS TYPE 2  Diagnosed with diabetes:  2004  Known diabetic complications: peripheral neuropathy, impotence, and obesity  Does patient follow with Endocrinology: No  Last optometry exam: 3 years  Most recent visit in Podiatry: >3 years  Appt in April  patient denies sores or cuts on feet today     Current diabetic medications include:  Metformin 1g daily   Farxiga 10 mg   Clarifications to above regimen: none  Adverse Effects: denies gi adrs, says he may be urinating more but is not sure    Past diabetic medications include:  Januvia - cost    Glucose Readings: BGs are high in the morning  Glucometer/CGM Type: acucheck and Relion prime  Patient tests BG 1x time per day - FBG  Current home BG readings:  FBG: Range (135 - 177); Average (140); # of readings above goal:     HYPOGLYCEMIA  Any episodes of hypoglycemia? No, denies .    Did patient treat episode of hypoglycemia appropriately? N/A   Does the patient have a prescription for ready-to-use Glucagon? Not on insulin  Does pt have proteinuria? No    BMI: 39.02; >/= 35? Yes, Class  Current Weight:  253 lbs    Lifestyle:  Diet: 1-2 meals/day.   Portion size  BK: cereal  LN: Baked chicken, salad, etc   Snacks: potato chips, fruit  Drinks: water  poor compliance with carbohydrate counting, poor intake fiber rich food sources, high fat food choices, and large portions  Physical Activity:   Walks a couple of blocks - 2x a week    Secondary Prevention:  Statin? Yes  ACE-I/ARB? Yes  Aspirin? Not at this time    Pertinent PMH Review:  PMH of Pancreatitis: No  PMH of Retinopathy: No  PMH of Urinary Tract Infections: No  PMH of MTC: No  HF:  No  _____________________________________________________________________________    Allergies   Reviewed? Yes  Changes? No    Medication Reconciliation:  Changed: none    PHARMACIST MEDICATION REVIEW    Drug Interactions  The following drug interactions were noted:    Concurrent use of apixaban with aspirin may increase the risk of bleeding.     Medication System Management  Patient's preferred pharmacy:   [facility] for  PAP  Eliquis  Farxiga  Ozempic  Insurance - mail order w/90 day supply  Adherence/Medication Access: satisfactory   Stopped taking aspirin  Describe actions taken to mitigate barriers: [facility] application for glp-1ra  _____________________________________________________________________________    Labs  Hepatic dysfunction?  No, LFTs WNL  Kidney dysfunction?  No, RFP ; GFR: 90  Does pt have proteinuria? no uacr   Last BMP? DUE; Last A1c: DUE; Date: September 2024  _____________________________________________________________________________    Objective   No Known Allergies  Social History     Social History Narrative    Not on file      Medication Review  Current Outpatient Medications   Medication Instructions    amLODIPine (NORVASC) 10 mg, oral, Daily    atorvastatin (LIPITOR) 40 mg, oral, Daily    blood sugar diagnostic (Accu-Chek Guide test strips) strip USE (ACCUCHEK GUIDE) TEST STRIPS TO TEST BLOOD SUGAR TWICE A DAY AS DIRECTED    Eliquis 5 mg, oral, 2 times daily    Farxiga 10 mg, oral, Daily    fish oil concentrate (Omega-3) 120-180 mg capsule 3 capsules, Daily    hydroCHLOROthiazide (HYDRODIURIL) 25 mg, oral, Daily    lancets misc Check sugar once daily    lisinopril 40 mg, oral, Daily    metFORMIN (GLUCOPHAGE) 1,000 mg, oral, Daily with breakfast    metoprolol tartrate (LOPRESSOR) 50 mg, oral, Daily    Mounjaro 2.5 mg, subcutaneous, Weekly      Vitals  BP Readings from Last 2 Encounters:   03/06/25 142/87   11/27/24 (!) 126/93     BMI Readings from Last 1 Encounters:   03/06/25 39.02  kg/m²      Labs  A1C  Lab Results   Component Value Date    HGBA1C 7.8 (H) 09/17/2024    HGBA1C 7.4 (A) 04/03/2024    HGBA1C 6.9 (A) 12/04/2023     BMP  Lab Results   Component Value Date    CALCIUM 9.7 09/17/2024     09/17/2024    K 4.2 09/17/2024    CO2 29 09/17/2024     09/17/2024    BUN 14 09/17/2024    CREATININE 0.88 09/17/2024    EGFR 90 09/17/2024     LFTs  Lab Results   Component Value Date    ALT 15 09/17/2024    AST 15 09/17/2024    ALKPHOS 79 09/17/2024    BILITOT 1.0 09/17/2024     FLP  Lab Results   Component Value Date    TRIG 68 09/17/2024    CHOL 111 09/17/2024    LDLCALC 59 09/17/2024    HDL 38.0 09/17/2024       Weight Management  Wt Readings from Last 3 Encounters:   03/06/25 115 kg (253 lb)   08/13/24 113 kg (249 lb)   04/03/24 115 kg (254 lb)      There is no height or weight on file to calculate BMI.   _____________________________________________________________________________    Assessment/Plan   Problem List Items Addressed This Visit       Atrial fibrillation, persistent (Multi)    Diabetes mellitus type 2 without retinopathy (Multi) - Primary    Diabetic neuropathy (Multi)    Hypertension    Hyperlipidemia    Male erectile disorder of organic origin    JOSHUA on CPAP     Other Visit Diagnoses       Type 2 diabetes mellitus with diabetic polyneuropathy, without long-term current use of insulin               Patient Assistance Program Approval:   We are pleased to inform you that your application for assistance has been approved.   This approval is valid through  10/7/25  as long as the following criteria continue to be satisfied:  Your medication (Mounjaro) remains covered under your current insurance plan.  Your prescriber does not discontinue therapy.  You do not seek reimbursement from any other private or government-funded programs for the medication.  Under this program, the pharmacy will first bill your insurance plan for your indemnified specified medication. The  [employer] will then offset your copay balance, so that your out-of pocket expense for your specialty medication will be $0.00.    Patient Goals  Fasting B - 130 mg/dL  Postprandial BG: less than 180 mg/dL  A1c: less than 7%;  Is pt at goal? No, above ; A1C: 7.8 on   Previous  Patient's SMBGs are above goal.     Today I walked the patient through his first injection of mounjaro. He was able to teach back important points and correctly self administered during out appointment    DIABETES MELLITUS TYPE 2 PLAN  Diabetes mellitus Type II, under unsatisfactory control.  Reinforced healthy diet, lifestyle, and exercise.  Prescription medication ordered.  Lab work ordered.  Rationale:   Candidate for GLP? Yes; Switch ozempic to Tirzepatide as it has an indication for sleep apnea and patient is moderate risk for MACE with no prior events (Evidence for use in high risk)  Candidate for SGLT2? Yes; Farxiga 10 mg  Candidate for DPP4? Not at this time; not with glp  Candidate for Metformin? Yes; but he is not on max dose; he denies ever having adrs  Candidate for Insulin? No; A1c >/= 11? No  Patient does not meet criteria for apixaban dosing - and is moderate-high risk for CVD event  Aspirin is no longer indicated for primary prevention. Patient is elderly and the risk of bleed outweighs the benefit. The patient has also not even been taking the aspirin in months    Medication Changes:  CONTINUE:  Metformin 1g once daily  Atorvastatin  Apixaban 5 mg BID  Mounjaro 2.5 mg once weekly (on )    Future Considerations:  Repeat lipid - discontinue fish oil as it is large pill burden with minimal TG lowering effects. TG and LDL-C is at goal  Increase metformin or discontinue metformin if BG are optimized  Monitoring  Bleeding  ADRs  Weight, SMBG, Hba1c  Renal and liver function, CBC    Patient Instructions  Decrease carbs in diet and increase fiber in diet  Consider taking statin again  Please get  updated labs    Education:   Diet:   Meal Planning (Meal Carbohydrate Choices)  Exercise  Discussed foot care.  Reminded to get yearly retinal exam.  Reminded to bring in blood sugar diary at next visit.  Zakia  Counseled patient on Mounjaro MOA, expectations, side effects, duration of therapy, administration, and monitoring parameters.  Provided detailed dosing and administration counseling to ensure proper technique.   Reviewed Mounjaro titration schedule, starting with 2.5 mg once weekly to a goal of 15 mg once weekly if tolerated  Counseled patient on the benefits of GLP-1ra glycemic control and weight loss  Reviewed storage requirements of Mounjaro when not in use, and when to administer the medication if a dose is missed.  Advised patient that they may experience improved satiety after meals and portion sizes of meals may be reduced as doses of Mounjaro increase.     Clinical Pharmacist follow-up: 3 weeks, Telehealth visit  Restart statin?  Increase metformin   Review medications changes  Last appt: stop aspirin, increase eliquis  Today --> Start mounjaro  Upcoming   Increase metformin    Continue all meds under the continuation of care with the referring provider and clinical pharmacy team.    Thank you,  Josselin Marvin, PharmD   Clinical Pharmacist Specialist, Primary Care   Phone: [phone]   Fax: [phone]   Email: [email or web address]    Verbal consent to manage patient's drug therapy was obtained from the patient. They were informed they may decline to participate or withdraw from participation in pharmacy services at any time

## 2025-03-18 NOTE — PATIENT INSTRUCTIONS
MOUNJARO EDUCATION     Mounjaro is the first and only approved treatment in a different class of medication for type 2 diabetes.  It works differently than other type 2 diabetes medications by directly activating GIP and GLP-1 pathways to help regulate blood sugar.  GIP=glucose-dependent insulinotropic polypeptide; GLP-1=glucagon-like peptide-1  How Mounjaro works  Mounjaro is an injectable prescription medicine that is used along with diet and exercise to improve blood sugar (glucose) in adults with type 2 diabetes mellitus.  Mounjaro helps your body both regulate blood sugar and decrease how much food you eat  It may start working to lower blood sugar from the first dose.  The 2.5 mg starting dose is not meant for blood sugar control.  Mounjaro works in multiple ways. It helps:  The body release insulin when blood sugar is high  The body remove excess sugar from the blood  Stop the liver from making and releasing too much sugar  Reduce how much food is eaten  Slow down how quickly food leaves the stomach. This lessens over time  MOUNJARO is a single-dose prefilled pen.  MOUNJARO is used 1 time each week.  Inject under the skin (subcutaneously) only.  You or another person can inject into your stomach (abdomen) or thigh.  Change (rotate) your injection site with each weekly injection. Do not use the same site for each injection.  Another person can inject into the back of your upper arm.  You may give an injection of Mounjaro and insulin in the same body area (such as your stomach area), but not right next to each other.    If you take too much Mounjaro, call your healthcare provider or seek medical advice promptly.    Mounjaro may cause serious side effects, including:  Inflammation of the pancreas (pancreatitis). Stop using Mounjaro and call your healthcare provider right away if you have severe pain in your stomach area (abdomen) that will not go away, with or without vomiting. You may feel the pain from your  abdomen to your back.  Low blood sugar (hypoglycemia). Your risk for getting low blood sugar may be higher if you use Mounjaro with another medicine that can cause low blood sugar, such as a sulfonylurea or insulin. Signs and symptoms of low blood sugar may include dizziness or light-headedness, sweating, confusion or drowsiness, headache, blurred vision, slurred speech, shakiness, fast heartbeat, anxiety, irritability, or mood changes, hunger, weakness and feeling jittery.  Serious allergic reactions. Stop using Mounjaro and get medical help right away if you have any symptoms of a serious allergic reaction, including swelling of your face, lips, tongue or throat, problems breathing or swallowing, severe rash or itching, fainting or feeling dizzy, and very rapid heartbeat.  Kidney problems (kidney failure). In people who have kidney problems, diarrhea, nausea, and vomiting may cause a loss of fluids (dehydration), which may cause kidney problems to get worse. It is important for you to drink fluids to help reduce your chance of dehydration.  Severe stomach problems. Stomach problems, sometimes severe, have been reported in people who use Mounjaro. Tell your healthcare provider if you have stomach problems that are severe or will not go away.  Changes in vision. Tell your healthcare provider if you have changes in vision during treatment with Mounjaro.  Gallbladder problems. Gallbladder problems have happened in some people who use Mounjaro. Tell your healthcare provider right away if you get symptoms of gallbladder problems, which may include pain in your upper stomach (abdomen), fever, yellowing of skin or eyes (jaundice), and pola-colored stools.  Food or liquid getting into the lungs during surgery or other procedures that use anesthesia or deep sleepiness (deep sedation). Mounjaro may increase the chance of food getting into your lungs during surgery or other procedures. Tell all your healthcare providers that  you are taking Mounjaro before you are scheduled to have surgery or other procedures.    Preparing to inject MOUNJARO  Remove the Pen from the refrigerator.  Leave the gray base cap on until you are ready to inject.  Check the Pen label to make sure you have the right medicine and dose, and that it has not .  Inspect the Pen to make sure that it is not damaged.  Make sure the medicine:  is not frozen  is not cloudy  is colorless to slightly yellow  does not have particles  Choose your injection site  You or another person can inject the medicine in your stomach (abdomen) or thigh.  Make sure the Pen is locked.  Do not unlock the Pen until you place the clear base on your skin and are ready to inject.  Pull the gray base cap straight off and throw it away in your household trash.  Do not put the gray base cap back on - this could damage the needle.  Do not touch the needle  Place clear base on skin, then unlock  Place the clear base flat against your skin at the injection site.  Unlock by turning the lock ring.  Press and hold the purple injection button for up to 10 seconds.  Listen for:  First click = injection started  Second click = injection completed  You will know your injection is complete when the gray plunger is visible.    Disposing of your used Pen  Put your used Pen in an FDA-cleared sharps disposal container right away after use. Do not throw away (dispose of) Pens in your household trash.  If you do not have an FDA-cleared sharps disposal container, you may use a household container that is: made of a heavy-duty plastic, can be closed with a tight-fitting, puncture-resistant lid, without sharps being able to come out, upright and stable during use, leak-resistant, and properly labeled to warn of hazardous waste inside the container.  When your sharps disposal container is almost full, you will need to follow your community guidelines for the right way to dispose of your sharps disposal  container. There may be state or local laws about how you should throw away used needles and syringes.   For more information about safe sharps disposal, and for specific information about sharps disposal in the state that you live in, go to the FDA's website at: http://www.fda.gov/safesharpsdisposal.  Do not recycle your used sharps disposal container.    Storage and handling  Store your Pen in the refrigerator between 36°F to 46°F (2°C to 8°C).  You may store your Pen at room temperature up to 86°F (30°C) for up to 21 days.  Do not freeze your Pen. If the Pen has been frozen, throw the Pen away and use a new Pen.  Store your Pen in the original carton to protect your Pen from light.  The Pen has glass parts. Handle it carefully. If you drop the Pen on a hard surface, do not use it. Use a new Pen for your injection.  Keep your MOUNJARO Pen and all medicines out of the reach of children.    Commonly asked questions  What if I see air bubbles in my Pen?  Air bubbles are normal.  What if my Pen is not at room temperature?  It is not necessary to warm the Pen to room temperature.  What if I unlock the Pen and press the purple injection button before pulling off the gray base cap?  Do not remove the gray base cap.   Throw away the Pen and get a new Pen.  What if there is a drop of liquid on the tip of the needle when I remove the gray base cap?  A drop of liquid on the tip of the needle is normal. Do not touch the needle.  Do I need to hold the injection button down until the injection is complete?  This is not necessary, but it may help you keep the Pen steady against your skin.  I heard more than 2 clicks during my injection--2 loud clicks and 1 soft one. Did I get my complete injection?  Some people may hear a soft click right before the second loud click. That is the normal operation of the Pen. Do not remove the Pen from your skin until you hear the second loud click.  I am not sure if my Pen worked the right  way.  Check to see if you have received your dose. Your dose was delivered the right way if the gray plunger is visible.   If you do not see the ang plunger, contact [facility] at 8-409-Gmyfj-Rx ([phone]) for further instructions. Until then, store your Pen safely to avoid an accidental needle stick.

## 2025-03-21 PROCEDURE — RXMED WILLOW AMBULATORY MEDICATION CHARGE

## 2025-03-24 ENCOUNTER — PHARMACY VISIT (OUTPATIENT)
Dept: PHARMACY | Facility: CLINIC | Age: 75
End: 2025-03-24
Payer: COMMERCIAL

## 2025-04-04 DIAGNOSIS — E78.00 PURE HYPERCHOLESTEROLEMIA: ICD-10-CM

## 2025-04-04 DIAGNOSIS — E66.01 OBESITY, MORBID (MULTI): ICD-10-CM

## 2025-04-04 DIAGNOSIS — I10 PRIMARY HYPERTENSION: ICD-10-CM

## 2025-04-04 DIAGNOSIS — E11.9 DIABETES MELLITUS TYPE 2 WITHOUT RETINOPATHY (MULTI): ICD-10-CM

## 2025-04-04 DIAGNOSIS — G47.33 OSA ON CPAP: ICD-10-CM

## 2025-04-04 DIAGNOSIS — N52.9 MALE ERECTILE DISORDER OF ORGANIC ORIGIN: ICD-10-CM

## 2025-04-04 DIAGNOSIS — E08.42 DIABETIC POLYNEUROPATHY ASSOCIATED WITH DIABETES MELLITUS DUE TO UNDERLYING CONDITION: ICD-10-CM

## 2025-04-04 PROCEDURE — RXMED WILLOW AMBULATORY MEDICATION CHARGE

## 2025-04-04 RX ORDER — TIRZEPATIDE 2.5 MG/.5ML
2.5 INJECTION, SOLUTION SUBCUTANEOUS WEEKLY
Qty: 2 ML | Refills: 0 | OUTPATIENT
Start: 2025-04-04

## 2025-04-07 ENCOUNTER — APPOINTMENT (OUTPATIENT)
Dept: PHARMACY | Facility: HOSPITAL | Age: 75
End: 2025-04-07
Payer: COMMERCIAL

## 2025-04-07 ENCOUNTER — TELEPHONE (OUTPATIENT)
Dept: PHARMACY | Facility: HOSPITAL | Age: 75
End: 2025-04-07

## 2025-04-07 NOTE — TELEPHONE ENCOUNTER
Unable to reach patient. Primary number is out of service, secondary number did not ring and am unable to leave a VM

## 2025-04-10 ENCOUNTER — PHARMACY VISIT (OUTPATIENT)
Dept: PHARMACY | Facility: CLINIC | Age: 75
End: 2025-04-10
Payer: COMMERCIAL

## 2025-04-15 ENCOUNTER — OFFICE VISIT (OUTPATIENT)
Dept: PODIATRY | Facility: CLINIC | Age: 75
End: 2025-04-15
Payer: COMMERCIAL

## 2025-04-15 DIAGNOSIS — M79.672 PAIN IN BOTH FEET: Primary | ICD-10-CM

## 2025-04-15 DIAGNOSIS — B35.1 ONYCHOMYCOSIS: ICD-10-CM

## 2025-04-15 DIAGNOSIS — E11.9 ENCOUNTER FOR DIABETIC FOOT EXAM (MULTI): ICD-10-CM

## 2025-04-15 DIAGNOSIS — L85.3 XEROSIS CUTIS: ICD-10-CM

## 2025-04-15 DIAGNOSIS — E11.42 DIABETIC POLYNEUROPATHY ASSOCIATED WITH TYPE 2 DIABETES MELLITUS: ICD-10-CM

## 2025-04-15 DIAGNOSIS — M79.671 PAIN IN BOTH FEET: Primary | ICD-10-CM

## 2025-04-15 PROCEDURE — 99213 OFFICE O/P EST LOW 20 MIN: CPT | Performed by: PODIATRIST

## 2025-04-15 PROCEDURE — 1160F RVW MEDS BY RX/DR IN RCRD: CPT | Performed by: PODIATRIST

## 2025-04-15 PROCEDURE — 1123F ACP DISCUSS/DSCN MKR DOCD: CPT | Performed by: PODIATRIST

## 2025-04-15 PROCEDURE — 1036F TOBACCO NON-USER: CPT | Performed by: PODIATRIST

## 2025-04-15 PROCEDURE — 4010F ACE/ARB THERAPY RXD/TAKEN: CPT | Performed by: PODIATRIST

## 2025-04-15 PROCEDURE — 1159F MED LIST DOCD IN RCRD: CPT | Performed by: PODIATRIST

## 2025-04-15 NOTE — PROGRESS NOTES
Chief Complaint   Patient presents with    Follow-up     NAIL CARE     Chief Complaint   Patient presents with    Follow-up     NAIL CARE         Chief complaint painful nails on both feet.   Nail are thickened.  Painful distally.  Lots of subungual debris.  Loss of dry skin both feet.  Dry skin on the legs.  He did not continue with the ammonium lactate because the cost.  He is using Eucerin.  No other symptoms or complaints.  Glucose has mostly been stable.  He is pending a new A1c.  Pain at PCP visit soon.  He is on both Farxiga and Mounjaro.  He is using a cane.  No changes past medical history.  This includes A-fib, type 2 diabetes, diabetic neuropathy, hypertension, hyperlipidemia, obesity.  JOSHUA on CPAP.    Constitutional: Patient alert. No acute distress.  Psychiatric: Normal mood and affect.  HEENT: Sclera clear. Wearing glasses.  Respiratory: Breathing unlabored.  Dermatologic: Nails are extremely hypertrophic.  Lots of subungual debris.  Distal dry skin distal dry tuft of skin beneath the nail plate.  Painful to palpation without evidence of any acute inflammatory infectious process.  Mild xerosis acute both feet and lower legs.  Dried debris between the toes.  No tinea pedis.  No ulcers no pre-ulcerative areas.  Minimal xerosis noted.   Neurological: Mild sensory impairment.  Monofilament testing is diminished bilaterally.  Gross sensation is intact.  Musculoskeletal: Motor function is intact for patient's age.  No acute deficits noted.  Using a cane.  Vascular: Pedal pulses are intact and symmetric.  Mild nonpitting edema bilaterally.  Both feet are warm to touch.     Impression: Onychomycosis bilateral foot pain, onycholysis, xerosis, and diabetes and neuropathy.     -Today's treatment and course of therapy was discussed with the patient in detail. Patient's questions were answered. Proper foot care was discussed. This dictation was done using Dragon computer software and as such may contain grammatical  errors.      - I recommend more timely follow-up.  Reviewed diabetic footcare in detail.  Patient is at risk of infection because of his diabetes.Please check your feet daily    -Review seasonal foot care.  Continue with moisturizer use encouraged.    - Labs September 24 reviewed.  A1c 7.8.    -Medicine note March 2025 reviewed.

## 2025-04-16 DIAGNOSIS — E66.01 OBESITY, MORBID (MULTI): ICD-10-CM

## 2025-04-16 DIAGNOSIS — E11.9 DIABETES MELLITUS TYPE 2 WITHOUT RETINOPATHY (MULTI): ICD-10-CM

## 2025-04-16 DIAGNOSIS — N52.9 MALE ERECTILE DISORDER OF ORGANIC ORIGIN: ICD-10-CM

## 2025-04-16 DIAGNOSIS — E78.00 PURE HYPERCHOLESTEROLEMIA: ICD-10-CM

## 2025-04-16 DIAGNOSIS — E08.42 DIABETIC POLYNEUROPATHY ASSOCIATED WITH DIABETES MELLITUS DUE TO UNDERLYING CONDITION: ICD-10-CM

## 2025-04-16 DIAGNOSIS — G47.33 OSA ON CPAP: ICD-10-CM

## 2025-04-16 DIAGNOSIS — I10 PRIMARY HYPERTENSION: ICD-10-CM

## 2025-04-16 PROCEDURE — RXMED WILLOW AMBULATORY MEDICATION CHARGE

## 2025-04-17 ENCOUNTER — PHARMACY VISIT (OUTPATIENT)
Dept: PHARMACY | Facility: CLINIC | Age: 75
End: 2025-04-17
Payer: COMMERCIAL

## 2025-04-18 RX ORDER — TIRZEPATIDE 2.5 MG/.5ML
2.5 INJECTION, SOLUTION SUBCUTANEOUS WEEKLY
Qty: 2 ML | Refills: 0 | OUTPATIENT
Start: 2025-04-18

## 2025-04-22 ENCOUNTER — APPOINTMENT (OUTPATIENT)
Dept: PRIMARY CARE | Facility: CLINIC | Age: 75
End: 2025-04-22
Payer: COMMERCIAL

## 2025-05-01 ENCOUNTER — APPOINTMENT (OUTPATIENT)
Dept: PRIMARY CARE | Facility: CLINIC | Age: 75
End: 2025-05-01
Payer: COMMERCIAL

## 2025-05-04 PROCEDURE — RXMED WILLOW AMBULATORY MEDICATION CHARGE

## 2025-05-07 ENCOUNTER — APPOINTMENT (OUTPATIENT)
Dept: PRIMARY CARE | Facility: CLINIC | Age: 75
End: 2025-05-07
Payer: COMMERCIAL

## 2025-05-08 ENCOUNTER — PHARMACY VISIT (OUTPATIENT)
Dept: PHARMACY | Facility: CLINIC | Age: 75
End: 2025-05-08
Payer: COMMERCIAL

## 2025-05-08 DIAGNOSIS — E08.42 DIABETIC POLYNEUROPATHY ASSOCIATED WITH DIABETES MELLITUS DUE TO UNDERLYING CONDITION: Primary | ICD-10-CM

## 2025-05-08 DIAGNOSIS — E66.01 OBESITY, MORBID (MULTI): ICD-10-CM

## 2025-05-08 DIAGNOSIS — G47.33 OSA ON CPAP: ICD-10-CM

## 2025-05-08 DIAGNOSIS — E78.00 PURE HYPERCHOLESTEROLEMIA: ICD-10-CM

## 2025-05-08 DIAGNOSIS — I10 PRIMARY HYPERTENSION: ICD-10-CM

## 2025-05-08 DIAGNOSIS — I48.19 ATRIAL FIBRILLATION, PERSISTENT (MULTI): ICD-10-CM

## 2025-05-14 ENCOUNTER — APPOINTMENT (OUTPATIENT)
Dept: PRIMARY CARE | Facility: CLINIC | Age: 75
End: 2025-05-14
Payer: COMMERCIAL

## 2025-05-14 VITALS
RESPIRATION RATE: 16 BRPM | SYSTOLIC BLOOD PRESSURE: 124 MMHG | DIASTOLIC BLOOD PRESSURE: 80 MMHG | TEMPERATURE: 97.3 F | WEIGHT: 253.3 LBS | BODY MASS INDEX: 39.06 KG/M2 | OXYGEN SATURATION: 95 % | HEART RATE: 78 BPM

## 2025-05-14 DIAGNOSIS — I48.91 RAPID ATRIAL FIBRILLATION (MULTI): ICD-10-CM

## 2025-05-14 DIAGNOSIS — E11.42 TYPE 2 DIABETES MELLITUS WITH DIABETIC POLYNEUROPATHY, WITHOUT LONG-TERM CURRENT USE OF INSULIN: Primary | ICD-10-CM

## 2025-05-14 DIAGNOSIS — G47.33 OSA ON CPAP: ICD-10-CM

## 2025-05-14 PROBLEM — C61 MALIGNANT NEOPLASM OF PROSTATE (MULTI): Status: ACTIVE | Noted: 2025-05-14

## 2025-05-14 PROBLEM — C61 PROSTATE CANCER (MULTI): Status: RESOLVED | Noted: 2023-02-15 | Resolved: 2025-05-14

## 2025-05-14 PROBLEM — H18.519 CORNEAL GUTTATA: Status: ACTIVE | Noted: 2020-08-05

## 2025-05-14 PROCEDURE — 1159F MED LIST DOCD IN RCRD: CPT | Performed by: FAMILY MEDICINE

## 2025-05-14 PROCEDURE — 99214 OFFICE O/P EST MOD 30 MIN: CPT | Performed by: FAMILY MEDICINE

## 2025-05-14 PROCEDURE — 3074F SYST BP LT 130 MM HG: CPT | Performed by: FAMILY MEDICINE

## 2025-05-14 PROCEDURE — 4010F ACE/ARB THERAPY RXD/TAKEN: CPT | Performed by: FAMILY MEDICINE

## 2025-05-14 PROCEDURE — 1125F AMNT PAIN NOTED PAIN PRSNT: CPT | Performed by: FAMILY MEDICINE

## 2025-05-14 PROCEDURE — 3079F DIAST BP 80-89 MM HG: CPT | Performed by: FAMILY MEDICINE

## 2025-05-14 ASSESSMENT — PAIN SCALES - GENERAL: PAINLEVEL_OUTOF10: 6

## 2025-05-14 NOTE — PROGRESS NOTES
Subjective   Patient ID: Hollis Martin is a 75 y.o. male who presents for No chief complaint on file..    HPI   Type 2 DM follow up    Discuss sleep study  Restless sleep  Daytimesleepiness  Wears CPAP   Review of Systems   All other systems reviewed and are negative.      Objective   /80 (BP Location: Left arm, Patient Position: Sitting, BP Cuff Size: Large adult)   Pulse 78   Temp 36.3 °C (97.3 °F) (Temporal)   Resp 16   Wt 115 kg (253 lb 4.8 oz)   SpO2 95%   BMI 39.06 kg/m²     Physical Exam  Vitals and nursing note reviewed.   Cardiovascular:      Rate and Rhythm: Normal rate and regular rhythm.   Pulmonary:      Effort: Pulmonary effort is normal.      Breath sounds: Normal breath sounds.   Musculoskeletal:      Cervical back: Neck supple.   Lymphadenopathy:      Cervical: No cervical adenopathy.   Neurological:      Mental Status: He is alert.         Assessment/Plan   Diagnoses and all orders for this visit:  Type 2 diabetes mellitus with diabetic polyneuropathy, without long-term current use of insulin  Comments:  optimal at YbB1l=9.1  Rapid atrial fibrillation (Multi)  JOSHUA on CPAP  -     In-Center Sleep Study (Non-Sleep Provider); Future  Other orders  -     Follow Up In Primary Care - Established  -     Follow Up In Primary Care - Established; Future

## 2025-05-17 DIAGNOSIS — I48.19 ATRIAL FIBRILLATION, PERSISTENT (MULTI): ICD-10-CM

## 2025-05-17 DIAGNOSIS — I10 PRIMARY HYPERTENSION: ICD-10-CM

## 2025-05-19 RX ORDER — METOPROLOL TARTRATE 50 MG/1
50 TABLET ORAL DAILY
Qty: 90 TABLET | Refills: 3 | Status: SHIPPED | OUTPATIENT
Start: 2025-05-19

## 2025-05-27 PROCEDURE — RXMED WILLOW AMBULATORY MEDICATION CHARGE

## 2025-05-28 ENCOUNTER — PHARMACY VISIT (OUTPATIENT)
Dept: PHARMACY | Facility: CLINIC | Age: 75
End: 2025-05-28
Payer: COMMERCIAL

## 2025-06-06 DIAGNOSIS — E11.42 TYPE 2 DIABETES MELLITUS WITH DIABETIC POLYNEUROPATHY, WITHOUT LONG-TERM CURRENT USE OF INSULIN: ICD-10-CM

## 2025-06-12 ENCOUNTER — CLINICAL SUPPORT (OUTPATIENT)
Dept: SLEEP MEDICINE | Facility: CLINIC | Age: 75
End: 2025-06-12
Payer: COMMERCIAL

## 2025-06-12 DIAGNOSIS — G47.33 OSA ON CPAP: ICD-10-CM

## 2025-06-13 VITALS
DIASTOLIC BLOOD PRESSURE: 82 MMHG | HEIGHT: 68 IN | BODY MASS INDEX: 38.42 KG/M2 | SYSTOLIC BLOOD PRESSURE: 130 MMHG | WEIGHT: 253.53 LBS

## 2025-06-13 ASSESSMENT — SLEEP AND FATIGUE QUESTIONNAIRES
HOW LIKELY ARE YOU TO NOD OFF OR FALL ASLEEP WHILE SITTING AND READING: SLIGHT CHANCE OF DOZING
HOW LIKELY ARE YOU TO NOD OFF OR FALL ASLEEP WHEN YOU ARE A PASSENGER IN A CAR FOR AN HOUR WITHOUT A BREAK: SLIGHT CHANCE OF DOZING
HOW LIKELY ARE YOU TO NOD OFF OR FALL ASLEEP WHILE WATCHING TV: WOULD NEVER DOZE
HOW LIKELY ARE YOU TO NOD OFF OR FALL ASLEEP WHILE SITTING AND TALKING TO SOMEONE: WOULD NEVER DOZE
SITING INACTIVE IN A PUBLIC PLACE LIKE A CLASS ROOM OR A MOVIE THEATER: SLIGHT CHANCE OF DOZING
HOW LIKELY ARE YOU TO NOD OFF OR FALL ASLEEP WHILE LYING DOWN TO REST IN THE AFTERNOON WHEN CIRCUMSTANCES PERMIT: WOULD NEVER DOZE
HOW LIKELY ARE YOU TO NOD OFF OR FALL ASLEEP IN A CAR, WHILE STOPPED FOR A FEW MINUTES IN TRAFFIC: WOULD NEVER DOZE
ESS-CHAD TOTAL SCORE: 3
HOW LIKELY ARE YOU TO NOD OFF OR FALL ASLEEP WHILE SITTING QUIETLY AFTER LUNCH WITHOUT ALCOHOL: WOULD NEVER DOZE

## 2025-06-13 NOTE — PROGRESS NOTES
[facility] TECH NOTE:     Patient: Hollis Martin   MRN//AGE: [id number]  1950  75 y.o.   Technologist: Coby Barlow   Room: 2   Service Date: 25        Sleep Testing Location: [facility]: 3    TECHNOLOGIST SLEEP STUDY PROCEDURE NOTE:   This sleep study is being conducted according to the policies and procedures outlined by the [employer] accreditation standards.  The sleep study procedure and processes involved during this appointment was explained to the patient/patient’s family, questions were answered. The patient/family verbalized understanding.      The patient is a 75 y.o. year old male scheduled for aCPAP titration with montage of: PAP. he arrived for his appointment.      The study that was ultimately completed was aCPAP titration with montage of: PAP.    The full study Was completed.  Patient questionnaires completed?: yes     Consents signed? yes    Initial Fall Risk Screening:     Hollis has not fallen in the last 6 months. his did not result in injury. Hollis does not have a fear of falling. He does not need assistance with sitting, standing, or walking. he does not need assistance walking in his home. he does not need assistance in an unfamiliar setting. The patient isusing an assistive device.     Brief Study observations: The patient is a 75 year old male here for a CPAP titration study.  Luiz arrived at 0715 and completed paperwork.  The study process and procedure were explained and questions answered.     Deviation to order/protocol and reason: none      If PAP, which was preferred mask/pressure/mode: CPAP/ResMed X30i large pillows/cool humidity      Other:None    After the procedure, the patient/family was informed to ensure followup with ordering clinician for testing results.      Technologist: Coby Barlow

## 2025-06-18 ENCOUNTER — OFFICE VISIT (OUTPATIENT)
Dept: PODIATRY | Facility: CLINIC | Age: 75
End: 2025-06-18
Payer: COMMERCIAL

## 2025-06-18 DIAGNOSIS — B35.1 ONYCHOMYCOSIS: ICD-10-CM

## 2025-06-18 DIAGNOSIS — L85.3 XEROSIS CUTIS: ICD-10-CM

## 2025-06-18 DIAGNOSIS — E11.9 ENCOUNTER FOR DIABETIC FOOT EXAM (MULTI): ICD-10-CM

## 2025-06-18 DIAGNOSIS — M79.671 PAIN IN BOTH FEET: Primary | ICD-10-CM

## 2025-06-18 DIAGNOSIS — E11.42 DIABETIC POLYNEUROPATHY ASSOCIATED WITH TYPE 2 DIABETES MELLITUS: ICD-10-CM

## 2025-06-18 DIAGNOSIS — M79.672 PAIN IN BOTH FEET: Primary | ICD-10-CM

## 2025-06-18 PROCEDURE — 99213 OFFICE O/P EST LOW 20 MIN: CPT | Performed by: PODIATRIST

## 2025-06-18 PROCEDURE — 4010F ACE/ARB THERAPY RXD/TAKEN: CPT | Performed by: PODIATRIST

## 2025-06-18 PROCEDURE — 1160F RVW MEDS BY RX/DR IN RCRD: CPT | Performed by: PODIATRIST

## 2025-06-18 PROCEDURE — 1159F MED LIST DOCD IN RCRD: CPT | Performed by: PODIATRIST

## 2025-06-18 PROCEDURE — 1036F TOBACCO NON-USER: CPT | Performed by: PODIATRIST

## 2025-06-18 NOTE — PROGRESS NOTES
Chief Complaint   Patient presents with    Follow-up     NAIL CARE           Chief complaint painful nails on both feet.   Nail are thickened.  Painful distally.  Lots of subungual debris.  Loss of dry skin both feet.  Dry skin on the legs.  He did not continue with the ammonium lactate because the cost.  He is using Eucerin.  No other symptoms or complaints.  Glucose has mostly been stable.  He is pending a new A1c.  Pain at PCP visit soon.  He is on both Farxiga and Mounjaro.  He is using a cane.  No changes past medical history.  This includes A-fib, type 2 diabetes, diabetic neuropathy, hypertension, hyperlipidemia, obesity.  JOSHUA on CPAP.    Constitutional: Patient alert. No acute distress.  Psychiatric: Normal mood and affect.  HEENT: Sclera clear. Wearing glasses.  Respiratory: Breathing unlabored.  Dermatologic: Nails are extremely hypertrophic.  Lots of subungual debris.  Distal dry skin distal dry tuft of skin beneath the nail plate.  Painful to palpation without evidence of any acute inflammatory infectious process.  Mild xerosis acute both feet and lower legs.  Dried debris between the toes.  No tinea pedis.  No ulcers no pre-ulcerative areas.  Minimal xerosis noted.   Neurological: Mild sensory impairment.  Monofilament testing is diminished bilaterally.  Gross sensation is intact.  Musculoskeletal: Motor function is intact for patient's age.  No acute deficits noted.  Using a cane.  Vascular: Pedal pulses are intact and symmetric.  Mild nonpitting edema bilaterally.  Both feet are warm to touch.     Impression: Onychomycosis bilateral foot pain, onycholysis, xerosis, and diabetes and neuropathy.     -Today's treatment and course of therapy was discussed with the patient in detail. Patient's questions were answered. Proper foot care was discussed. This dictation was done using Dragon computer software and as such may contain grammatical errors.      - I recommend more timely follow-up.  Reviewed diabetic  footcare in detail.  Patient is at risk of infection because of his diabetes.Please check your feet daily    -Review seasonal foot care.  Continue with moisturizer use encouraged.    - Labs September 24 reviewed.  A1c 7.8.    -Medicine note March 2025 reviewed.

## 2025-06-18 NOTE — PROGRESS NOTES
Chief Complaint   Patient presents with    Follow-up     NAIL CARE     Chief Complaint   Patient presents with    Follow-up     NAIL CARE     Chief Complaint   Patient presents with    Follow-up     NAIL CARE         Diabetic male here for diabetic foot care and foot check.  Does not think his sugars all that well-controlled.  But he is working on it.  Does not know new A1c.  Chief complaint painful nails on both feet.   Nail are thickened.  Painful distally.  Lots of subungual debris.  Dry skin to the feet and legs.  He does use lotion on them.  Denies any itching.  No other complaints.  No constitutional symptoms.    No changes past medical history.  Continue with his CPAP.  Restless sleep.  Has had a sleep study.  Follows with his primary care.    Constitutional: Patient alert. No acute distress.  Psychiatric: Normal mood and affect.  Respiratory: Breathing unlabored.  Dermatologic: Nails are extremely hypertrophic.  Lots of subungual debris.  Distal dry skin distal dry tuft of skin beneath the nail plate.  Painful to palpation without evidence of any acute inflammatory infectious process. No debris between the digits.  No maceration.  No tinea pedis.  No ulcers no pre-ulcerative areas.  Mild xerosis cutis lower legs and feet.  Neurological: Mild sensory impairment.  Monofilament testing is diminished bilaterally.  This appears to be baseline.  Gross sensation is intact.  Musculoskeletal: Motor function is intact for patient's age.  No acute deficits noted.  Using a cane.  Vascular: Pedal pulses are intact and symmetric.  Mild nonpitting edema bilaterally.  Both feet are warm to touch.     Impression: Onychomycosis bilateral foot pain, onycholysis, xerosis, and diabetes and neuropathy.  At risk for infection because of comorbidities.     -Today's treatment and course of therapy was discussed with the patient in detail. Patient's questions were answered. Proper foot care was discussed. This dictation was done  using Dragon computer software and as such may contain grammatical errors.      -Nail debridement was performed this was a greater than 6 nails. Nails were manually debrided.  They were decreased and girth in length. Appropriate care was discussed. Preventative care was reviewed. Follow-up in about 2 months unless there is any other problems.    - Reviewed diabetic footcare in detail.  Patient understanding of same.  Risk of infection discussed.    -Review seasonal foot care.  Continue with moisturizer use encouraged.    - No new labs to review.    -PCP note 5/14/2025 reviewed consistent with above findings

## 2025-06-25 DIAGNOSIS — G47.33 OSA ON CPAP: Primary | ICD-10-CM

## 2025-07-07 PROCEDURE — RXMED WILLOW AMBULATORY MEDICATION CHARGE

## 2025-07-08 ENCOUNTER — PHARMACY VISIT (OUTPATIENT)
Dept: PHARMACY | Facility: CLINIC | Age: 75
End: 2025-07-08
Payer: COMMERCIAL

## 2025-08-08 PROCEDURE — RXMED WILLOW AMBULATORY MEDICATION CHARGE

## 2025-08-12 ENCOUNTER — PHARMACY VISIT (OUTPATIENT)
Dept: PHARMACY | Facility: CLINIC | Age: 75
End: 2025-08-12
Payer: COMMERCIAL

## 2025-11-13 ENCOUNTER — APPOINTMENT (OUTPATIENT)
Dept: PRIMARY CARE | Facility: CLINIC | Age: 75
End: 2025-11-13
Payer: COMMERCIAL